# Patient Record
Sex: MALE | Race: WHITE | NOT HISPANIC OR LATINO | Employment: FULL TIME | URBAN - METROPOLITAN AREA
[De-identification: names, ages, dates, MRNs, and addresses within clinical notes are randomized per-mention and may not be internally consistent; named-entity substitution may affect disease eponyms.]

---

## 2019-03-09 ENCOUNTER — HOSPITAL ENCOUNTER (OUTPATIENT)
Dept: URGENT CARE | Facility: CLINIC | Age: 54
Discharge: HOME | End: 2019-03-09
Attending: PHYSICIAN ASSISTANT

## 2019-08-07 ENCOUNTER — OFFICE VISIT CONVERTED (OUTPATIENT)
Dept: FAMILY MEDICINE CLINIC | Facility: CLINIC | Age: 54
End: 2019-08-07
Attending: NURSE PRACTITIONER

## 2019-08-07 ENCOUNTER — HOSPITAL ENCOUNTER (OUTPATIENT)
Dept: GENERAL RADIOLOGY | Facility: HOSPITAL | Age: 54
Discharge: HOME | End: 2019-08-07
Attending: NURSE PRACTITIONER

## 2019-08-15 ENCOUNTER — HOSPITAL ENCOUNTER (OUTPATIENT)
Dept: GENERAL RADIOLOGY | Facility: HOSPITAL | Age: 54
Discharge: HOME | End: 2019-08-15
Attending: NURSE PRACTITIONER

## 2019-08-21 ENCOUNTER — HOSPITAL ENCOUNTER (OUTPATIENT)
Dept: LAB | Facility: HOSPITAL | Age: 54
Discharge: HOME | End: 2019-08-21
Attending: NURSE PRACTITIONER

## 2019-08-21 ENCOUNTER — OFFICE VISIT CONVERTED (OUTPATIENT)
Dept: ORTHOPEDIC SURGERY | Facility: CLINIC | Age: 54
End: 2019-08-21
Attending: ORTHOPAEDIC SURGERY

## 2019-08-21 ENCOUNTER — CONVERSION ENCOUNTER (OUTPATIENT)
Dept: ORTHOPEDIC SURGERY | Facility: CLINIC | Age: 54
End: 2019-08-21

## 2019-08-21 LAB
APPEARANCE UR: CLEAR
BASOPHILS # BLD AUTO: 0.11 10*3/UL (ref 0–0.2)
BASOPHILS NFR BLD AUTO: 1.9 % (ref 0–3)
BILIRUB UR QL: NEGATIVE
CHOLEST SERPL-MCNC: 170 MG/DL (ref 107–200)
CHOLEST/HDLC SERPL: 3.8 {RATIO} (ref 3–6)
COLOR UR: YELLOW
CONV ABS IMM GRAN: 0.01 10*3/UL (ref 0–0.2)
CONV COLLECTION SOURCE (UA): NORMAL
CONV IMMATURE GRAN: 0.2 % (ref 0–1.8)
CONV UROBILINOGEN IN URINE BY AUTOMATED TEST STRIP: 0.2 {EHRLICHU}/DL (ref 0.1–1)
DEPRECATED RDW RBC AUTO: 44.8 FL (ref 35.1–43.9)
EOSINOPHIL # BLD AUTO: 0.24 10*3/UL (ref 0–0.7)
EOSINOPHIL # BLD AUTO: 4.2 % (ref 0–7)
ERYTHROCYTE [DISTWIDTH] IN BLOOD BY AUTOMATED COUNT: 14.6 % (ref 11.6–14.4)
FERRITIN SERPL-MCNC: 198 NG/ML (ref 30–300)
GLUCOSE UR QL: NEGATIVE MG/DL
HCT VFR BLD AUTO: 41.5 % (ref 42–52)
HDLC SERPL-MCNC: 45 MG/DL (ref 40–60)
HGB BLD-MCNC: 12.7 G/DL (ref 14–18)
HGB UR QL STRIP: NEGATIVE
IRON SATN MFR SERPL: 21 % (ref 20–55)
IRON SERPL-MCNC: 64 UG/DL (ref 70–180)
KETONES UR QL STRIP: NEGATIVE MG/DL
LDLC SERPL CALC-MCNC: 113 MG/DL (ref 70–100)
LEUKOCYTE ESTERASE UR QL STRIP: NEGATIVE
LYMPHOCYTES # BLD AUTO: 1.49 10*3/UL (ref 1–5)
LYMPHOCYTES NFR BLD AUTO: 25.9 % (ref 20–45)
MCH RBC QN AUTO: 25.8 PG (ref 27–31)
MCHC RBC AUTO-ENTMCNC: 30.6 G/DL (ref 33–37)
MCV RBC AUTO: 84.2 FL (ref 80–96)
MONOCYTES # BLD AUTO: 0.55 10*3/UL (ref 0.2–1.2)
MONOCYTES NFR BLD AUTO: 9.6 % (ref 3–10)
NEUTROPHILS # BLD AUTO: 3.35 10*3/UL (ref 2–8)
NEUTROPHILS NFR BLD AUTO: 58.2 % (ref 30–85)
NITRITE UR QL STRIP: NEGATIVE
NRBC CBCN: 0 % (ref 0–0.7)
PH UR STRIP.AUTO: 5 [PH] (ref 5–8)
PLATELET # BLD AUTO: 306 10*3/UL (ref 130–400)
PMV BLD AUTO: 11.4 FL (ref 9.4–12.4)
PROT UR QL: NEGATIVE MG/DL
PSA SERPL-MCNC: 0.19 NG/ML (ref 0–4)
RBC # BLD AUTO: 4.93 10*6/UL (ref 4.7–6.1)
SP GR UR: 1.02 (ref 1–1.03)
TIBC SERPL-MCNC: 310 UG/DL (ref 245–450)
TRANSFERRIN SERPL-MCNC: 217 MG/DL (ref 215–365)
TRIGL SERPL-MCNC: 60 MG/DL (ref 40–150)
VLDLC SERPL-MCNC: 12 MG/DL (ref 5–37)
WBC # BLD AUTO: 5.75 10*3/UL (ref 4.8–10.8)

## 2019-08-23 ENCOUNTER — HOSPITAL ENCOUNTER (OUTPATIENT)
Dept: LAB | Facility: HOSPITAL | Age: 54
Discharge: HOME | End: 2019-08-23
Attending: NURSE PRACTITIONER

## 2019-09-09 ENCOUNTER — OFFICE VISIT CONVERTED (OUTPATIENT)
Dept: FAMILY MEDICINE CLINIC | Facility: CLINIC | Age: 54
End: 2019-09-09
Attending: NURSE PRACTITIONER

## 2019-09-09 ENCOUNTER — CONVERSION ENCOUNTER (OUTPATIENT)
Dept: FAMILY MEDICINE CLINIC | Facility: CLINIC | Age: 54
End: 2019-09-09

## 2019-09-27 ENCOUNTER — HOSPITAL ENCOUNTER (OUTPATIENT)
Dept: GASTROENTEROLOGY | Facility: HOSPITAL | Age: 54
Setting detail: HOSPITAL OUTPATIENT SURGERY
Discharge: HOME | End: 2019-09-27
Attending: INTERNAL MEDICINE

## 2020-03-09 ENCOUNTER — OFFICE VISIT CONVERTED (OUTPATIENT)
Dept: FAMILY MEDICINE CLINIC | Facility: CLINIC | Age: 55
End: 2020-03-09
Attending: NURSE PRACTITIONER

## 2020-04-09 ENCOUNTER — TELEMEDICINE CONVERTED (OUTPATIENT)
Dept: FAMILY MEDICINE CLINIC | Facility: CLINIC | Age: 55
End: 2020-04-09
Attending: NURSE PRACTITIONER

## 2020-05-04 ENCOUNTER — TELEMEDICINE CONVERTED (OUTPATIENT)
Dept: FAMILY MEDICINE CLINIC | Facility: CLINIC | Age: 55
End: 2020-05-04
Attending: NURSE PRACTITIONER

## 2020-12-02 ENCOUNTER — OFFICE VISIT CONVERTED (OUTPATIENT)
Dept: ORTHOPEDIC SURGERY | Facility: CLINIC | Age: 55
End: 2020-12-02
Attending: ORTHOPAEDIC SURGERY

## 2020-12-04 ENCOUNTER — OFFICE VISIT CONVERTED (OUTPATIENT)
Dept: FAMILY MEDICINE CLINIC | Facility: CLINIC | Age: 55
End: 2020-12-04
Attending: NURSE PRACTITIONER

## 2020-12-09 ENCOUNTER — HOSPITAL ENCOUNTER (OUTPATIENT)
Dept: OTHER | Facility: HOSPITAL | Age: 55
Discharge: HOME | End: 2020-12-09
Attending: ORTHOPAEDIC SURGERY

## 2020-12-21 ENCOUNTER — OFFICE VISIT CONVERTED (OUTPATIENT)
Dept: ORTHOPEDIC SURGERY | Facility: CLINIC | Age: 55
End: 2020-12-21
Attending: ORTHOPAEDIC SURGERY

## 2020-12-28 ENCOUNTER — HOSPITAL ENCOUNTER (OUTPATIENT)
Dept: LAB | Facility: HOSPITAL | Age: 55
Discharge: HOME | End: 2020-12-28
Attending: NURSE PRACTITIONER

## 2020-12-28 LAB
ALBUMIN SERPL-MCNC: 4.4 G/DL (ref 3.5–5)
ALBUMIN/GLOB SERPL: 1.5 {RATIO} (ref 1.4–2.6)
ALP SERPL-CCNC: 85 U/L (ref 56–119)
ALT SERPL-CCNC: 36 U/L (ref 10–40)
ANION GAP SERPL CALC-SCNC: 16 MMOL/L (ref 8–19)
APPEARANCE UR: CLEAR
AST SERPL-CCNC: 34 U/L (ref 15–50)
BASOPHILS # BLD AUTO: 0.08 10*3/UL (ref 0–0.2)
BASOPHILS NFR BLD AUTO: 1.6 % (ref 0–3)
BILIRUB SERPL-MCNC: 0.63 MG/DL (ref 0.2–1.3)
BILIRUB UR QL: NEGATIVE
BUN SERPL-MCNC: 19 MG/DL (ref 5–25)
BUN/CREAT SERPL: 21 {RATIO} (ref 6–20)
CALCIUM SERPL-MCNC: 9.3 MG/DL (ref 8.7–10.4)
CHLORIDE SERPL-SCNC: 105 MMOL/L (ref 99–111)
CHOLEST SERPL-MCNC: 220 MG/DL (ref 107–200)
CHOLEST/HDLC SERPL: 3.7 {RATIO} (ref 3–6)
COLOR UR: YELLOW
CONV ABS IMM GRAN: 0.01 10*3/UL (ref 0–0.2)
CONV CO2: 23 MMOL/L (ref 22–32)
CONV COLLECTION SOURCE (UA): NORMAL
CONV IMMATURE GRAN: 0.2 % (ref 0–1.8)
CONV TOTAL PROTEIN: 7.3 G/DL (ref 6.3–8.2)
CONV UROBILINOGEN IN URINE BY AUTOMATED TEST STRIP: 0.2 {EHRLICHU}/DL (ref 0.1–1)
CREAT UR-MCNC: 0.89 MG/DL (ref 0.7–1.2)
DEPRECATED RDW RBC AUTO: 43.9 FL (ref 35.1–43.9)
EOSINOPHIL # BLD AUTO: 0.31 10*3/UL (ref 0–0.7)
EOSINOPHIL # BLD AUTO: 6.2 % (ref 0–7)
ERYTHROCYTE [DISTWIDTH] IN BLOOD BY AUTOMATED COUNT: 14.6 % (ref 11.6–14.4)
FERRITIN SERPL-MCNC: 124 NG/ML (ref 30–300)
GFR SERPLBLD BASED ON 1.73 SQ M-ARVRAT: >60 ML/MIN/{1.73_M2}
GLOBULIN UR ELPH-MCNC: 2.9 G/DL (ref 2–3.5)
GLUCOSE SERPL-MCNC: 98 MG/DL (ref 70–99)
GLUCOSE UR QL: NEGATIVE MG/DL
HCT VFR BLD AUTO: 42.1 % (ref 42–52)
HDLC SERPL-MCNC: 60 MG/DL (ref 40–60)
HGB BLD-MCNC: 13.3 G/DL (ref 14–18)
HGB UR QL STRIP: NEGATIVE
IRON SATN MFR SERPL: 29 % (ref 20–55)
IRON SERPL-MCNC: 105 UG/DL (ref 70–180)
KETONES UR QL STRIP: NEGATIVE MG/DL
LDLC SERPL CALC-MCNC: 147 MG/DL (ref 70–100)
LEUKOCYTE ESTERASE UR QL STRIP: NEGATIVE
LYMPHOCYTES # BLD AUTO: 1.21 10*3/UL (ref 1–5)
LYMPHOCYTES NFR BLD AUTO: 24.3 % (ref 20–45)
MCH RBC QN AUTO: 26.3 PG (ref 27–31)
MCHC RBC AUTO-ENTMCNC: 31.6 G/DL (ref 33–37)
MCV RBC AUTO: 83.2 FL (ref 80–96)
MONOCYTES # BLD AUTO: 0.51 10*3/UL (ref 0.2–1.2)
MONOCYTES NFR BLD AUTO: 10.2 % (ref 3–10)
NEUTROPHILS # BLD AUTO: 2.86 10*3/UL (ref 2–8)
NEUTROPHILS NFR BLD AUTO: 57.5 % (ref 30–85)
NITRITE UR QL STRIP: NEGATIVE
NRBC CBCN: 0 % (ref 0–0.7)
OSMOLALITY SERPL CALC.SUM OF ELEC: 292 MOSM/KG (ref 273–304)
PH UR STRIP.AUTO: 5.5 [PH] (ref 5–8)
PLATELET # BLD AUTO: 270 10*3/UL (ref 130–400)
PMV BLD AUTO: 11.2 FL (ref 9.4–12.4)
POTASSIUM SERPL-SCNC: 4.1 MMOL/L (ref 3.5–5.3)
PROT UR QL: NEGATIVE MG/DL
PSA SERPL-MCNC: 0.18 NG/ML (ref 0–4)
RBC # BLD AUTO: 5.06 10*6/UL (ref 4.7–6.1)
SODIUM SERPL-SCNC: 140 MMOL/L (ref 135–147)
SP GR UR: 1.02 (ref 1–1.03)
TIBC SERPL-MCNC: 365 UG/DL (ref 245–450)
TRANSFERRIN SERPL-MCNC: 255 MG/DL (ref 215–365)
TRIGL SERPL-MCNC: 65 MG/DL (ref 40–150)
TSH SERPL-ACNC: 1.75 M[IU]/L (ref 0.27–4.2)
VLDLC SERPL-MCNC: 13 MG/DL (ref 5–37)
WBC # BLD AUTO: 4.98 10*3/UL (ref 4.8–10.8)

## 2021-01-23 ENCOUNTER — HOSPITAL ENCOUNTER (OUTPATIENT)
Dept: PREADMISSION TESTING | Facility: HOSPITAL | Age: 56
Discharge: HOME | End: 2021-01-23
Attending: ORTHOPAEDIC SURGERY

## 2021-01-24 LAB — SARS-COV-2 RNA SPEC QL NAA+PROBE: NOT DETECTED

## 2021-01-28 ENCOUNTER — HOSPITAL ENCOUNTER (OUTPATIENT)
Dept: PERIOP | Facility: HOSPITAL | Age: 56
Setting detail: HOSPITAL OUTPATIENT SURGERY
Discharge: HOME | End: 2021-01-28
Attending: ORTHOPAEDIC SURGERY

## 2021-02-10 ENCOUNTER — OFFICE VISIT CONVERTED (OUTPATIENT)
Dept: ORTHOPEDIC SURGERY | Facility: CLINIC | Age: 56
End: 2021-02-10
Attending: PHYSICIAN ASSISTANT

## 2021-02-10 ENCOUNTER — CONVERSION ENCOUNTER (OUTPATIENT)
Dept: ORTHOPEDIC SURGERY | Facility: CLINIC | Age: 56
End: 2021-02-10

## 2021-03-02 ENCOUNTER — OFFICE VISIT CONVERTED (OUTPATIENT)
Dept: FAMILY MEDICINE CLINIC | Facility: CLINIC | Age: 56
End: 2021-03-02
Attending: NURSE PRACTITIONER

## 2021-03-12 ENCOUNTER — OFFICE VISIT CONVERTED (OUTPATIENT)
Dept: ORTHOPEDIC SURGERY | Facility: CLINIC | Age: 56
End: 2021-03-12
Attending: PHYSICIAN ASSISTANT

## 2021-04-09 ENCOUNTER — OFFICE VISIT CONVERTED (OUTPATIENT)
Dept: ORTHOPEDIC SURGERY | Facility: CLINIC | Age: 56
End: 2021-04-09

## 2021-05-10 NOTE — H&P
History and Physical      Patient Name: Kiran Hensley Sr   Patient ID: [id number]   Sex: Male   YOB: 1965    Primary Care Provider: Maggie CARTAGENA   Referring Provider: Maggie CARTAGENA    Visit Date: December 2, 2020    Provider: Puneet Coleman MD   Location: [facility]   Location Address: [address] [place] [address]  [id number]   Location Phone: [phone]          Chief Complaint  · Left Shoulder Pain      History Of Present Illness  Kiran Hensley Sr is a 55 year old /White male who presents today to [facility].      Patient has a history of a large rotator cuff tear in his left shoulder. He was scheduled for a Left Shoulder Arthroscopy, Subacromial Decompression, Distal Claviculectomy, Rotator Cuff Repair and Biceps Tenodesis on 11/7/2019 but his insurance wouldn't cover it due to him not trying other treatments first. Patient has been living with rotator cuff tear for a year since then and has been doing well without any treatment. He wasn't experiencing any soreness or pain before the injury at work. Patient was able to do his job well and other activities of daily living with no problem. Patient was at work on 11/18/20, lifting an object and felt a huge pop in his shoulder causing an onset pain in his shoulder. Since the pop at work he has been having trouble with his shoulder ROM and a onset of weakness.          Past Medical History  Arthritis         Past Surgical History  *Metal Implant; Colonoscopy; EGD; Joint Surgery; Knee replacement, left         Medication List  lisinopril 10 mg oral tablet; trazodone 100 mg oral tablet         Allergy List  Adhesive Tape; Tape Adherent       Allergies Reconciled  Family Medical History  Stroke; Cancer, Unspecified; No family history of colorectal cancer; Family history of Arthritis         Social History  Alcohol Use; Caffeine (Current every day); lives with spouse; ; .; Recreational  Drug Use (Never); Second hand smoke exposure (Never); Tobacco (Never); Working         Immunizations  Name Date Admin   Influenza 11/05/2020   Influenza 11/05/2020         Review of Systems  · Constitutional  o Denies  o : fever, chills, weight loss  · Cardiovascular  o Denies  o : chest pain, shortness of breath  · Gastrointestinal  o Denies  o : liver disease, heartburn, nausea, blood in stools  · Genitourinary  o Denies  o : painful urination, blood in urine  · Integument  o Denies  o : rash, itching  · Neurologic  o Denies  o : headache, weakness, loss of consciousness  · Musculoskeletal  o Denies  o : painful, swollen joints  · Psychiatric  o Denies  o : drug/alcohol addiction, anxiety, depression      Vitals  Date Time BP Position Site L\R Cuff Size HR RR TEMP (F) WT  HT  BMI kg/m2 BSA m2 O2 Sat FR L/min FiO2 HC       12/02/2020 07:48 AM         200lbs 0oz 6'   27.12 2.15             Physical Examination  · Constitutional  o Appearance  o : well developed, well-nourished, no obvious deformities present  · Head and Face  o Head  o :   § Inspection  § : normocephalic  o Face  o :   § Inspection  § : no facial lesions  · Eyes  o Conjunctivae  o : conjunctivae normal  o Sclerae  o : sclerae white  · Ears, Nose, Mouth and Throat  o Ears  o :   § External Ears  § : appearance within normal limits  § Hearing  § : intact  o Nose  o :   § External Nose  § : appearance normal  · Neck  o Inspection/Palpation  o : normal appearance  o Range of Motion  o : full range of motion  · Respiratory  o Respiratory Effort  o : breathing unlabored  o Inspection of Chest  o : normal appearance  o Auscultation of Lungs  o : no audible wheezing or rales  · Cardiovascular  o Heart  o : regular rate  · Gastrointestinal  o Abdominal Examination  o : soft and non-tender  · Skin and Subcutaneous Tissue  o General Inspection  o : intact, no rashes  · Psychiatric  o General  o : Alert and oriented x3  o Judgement and Insight  o : judgment and  insight intact  o Mood and Affect  o : mood normal, affect appropriate  · Left Shoulder  o Inspection  o : Sensation grossly intact. Neurovascular intact. Skin intact. Radial pulse 2+, ulnar pulse 2+. Atrophy of the rotator cuff. No skin discoloration or swelling. Tender biceps tendon. Tender AC joint. Tender at greater tuberosity. Forward flexion to 110. IR to back pocket. Pain with cross body adduction. Pain with empty can testing. Positive [place]. Positive Neer's. Pain with drop arm testing. 2 out of 5 strength. Good flexion and extension of elbow.   · In Office Procedures  o View  o : AP/LATERAL  o Site  o : left, scapula  o Indication  o : Left Shoulder Pain  o Study  o : X-rays ordered, taken in the office, and reviewed today.  o Xray  o : Evidence of AC joint arthritis. No fracture or dislocation.           Assessment  · Left shoulder pain, unspecified chronicity     719.41/M25.512  · Rotator cuff tear of left shoulder     840.4/M75.100      Plan  · Orders  o Scapula (Left) [facility] Preferred View ([id number]) - 719.41/M25.512 - 12/02/2020  · Medications  o Medications have been Reconciled  o Transition of Care or Provider Policy  · Instructions  o Dr. Coleman saw and examined the patient and agrees with plan.   o X-rays reviewed by Dr. Coleman.  o Reviewed the patient's Past Medical, Social, and Family history as well as the ROS at today's visit, no changes.  o Call or return if worsening symptoms.  o Follow up after MRI.  o This note was transcribed by Maria Ines Smith. darek  o Discussed diagnosis and treatment options with the patient. Patient will proceed with getting a new MRI.            Electronically Signed by: Maria Ines Smith-, Other -Author on December 3, 2020 10:30:54 AM  Electronically Co-signed by: Puneet Coleman MD -Reviewer on December 3, 2020 09:28:58 PM

## 2021-05-12 NOTE — PROGRESS NOTES
Quick Note      Patient Name: Kiran Hensley Sr   Patient ID: [id number]   Sex: Male   YOB: 1965    Primary Care Provider: Maggie CARTAGENA   Referring Provider: Maggie CARTAGENA    Visit Date: April 9, 2020    Provider: OSIEL Mena   Location: [facility]   Location Address: [address] [place] [address], [address]  [place]  [id number]   Location Phone: [phone]          History Of Present Illness  TELEHEALTH VISIT  Chief Complaint: insomnia   Kiran Hensley Sr is a 54 year old /White male who is presenting for evaluation via telehealth visit. Verbal consent obtained before beginning visit.   Provider spent 15 minutes with the patient during telehealth visit.   The following staff were present during this visit: Maggie CARTAGENA   Past Medical History/Overview of Patient Symptoms     insomnia-reports sleep not improved. States he is able to go to sleep fast but wakes up 2-3 hours after falling asleep. States the trazodone has helped him fall back to sleep quicker but still waking up.     htn-blood pressure log reviewed in the office today. States he takes it bid-morning and night. States his mornings are higher and evenings closer to normal.     AM-138/98, 136/93, 136/95, 132/92, 147/93, 141/90    pm-116/80, 126/84, 131/80, 132/88, 132/87, 114/82, 136/89, 143/89, 121/85, 132/91, 130/80, 121/87    pulse 65-97    knee swelling-reports it is much improved-states he took voltaren which helped.     WILMER-reports his allergies are a little better. Admits he has not been taking his antihistamine.               Assessment  · Essential hypertension     401.9/I10  reviewed b/p log -his morning blood pressures are borderline high and some of his evening blood pressures are as well-prescribed lisinopril 10mg qd. Scheduled a 1 month f/u to reassess. He is to keep a log and bring back to his nest visit.   · Insomnia, unspecified     780.52/G47.00  reports insomnia some improved but he  is still waking up 2-3 hours after falling asleep-increased trazodone to 100mg q hs.    Problems Reconciled  Plan  · Orders  o Physician Telephone Evaluation, 11-20 minutes (82248) - - 04/12/2020  · Medications  o lisinopril 10 mg oral tablet   SIG: take 1 tablet (10 mg) by oral route once daily   DISP: (30) tablets with 0 refills  Prescribed on 04/09/2020     o trazodone 100 mg oral tablet   SIG: take 1 tablet (100 mg) by oral route once daily at bedtime   DISP: (30) tablets with 0 refills  Adjusted on 04/09/2020     · Instructions  o Patient advised to monitor blood pressure (B/P) at home and journal readings. Patient informed that a B/P reading at home of more than 130/80 is considered hypertension. For readings greater psph894/90 or higher patient is advised to follow up in the office with readings for management. Patient advised to limit sodium intake.  o Avoid any electronic use for at least 30 minutes prior to bed time. Cell phone screens, tablets and TVs imitate daylight, so your brain can become confused on the time of day. No caffeine use in the late afternoon and evenings.  o Plan Of Care:   o Call the office with any concerns or questions.  o Discussed Covid-19 precautions including, but not limited to, social distancing, avoid touching your face, and hand washing.   o 1 month follow up  · Disposition  o Call or Return if symptoms worsen or persist.            Electronically Signed by: OSIEL Mena -Author on April 12, 2020 08:37:29 PM

## 2021-05-13 NOTE — PROGRESS NOTES
Quick Note      Patient Name: Kiran Hensley Sr   Patient ID: [id number]   Sex: Male   YOB: 1965    Primary Care Provider: Maggie CARTAGENA   Referring Provider: Maggie CARTAGENA    Visit Date: May 4, 2020    Provider: OSIEL Mena   Location: [facility]   Location Address: [address] [place] [address], [address]  [place]  [id number]   Location Phone: [phone]          History Of Present Illness  TELEHEALTH TELEPHONE VISIT  Chief Complaint: HTN AND INSOMNIA   Kiran Hensley Sr is a 54 year old /White male who is presenting for evaluation via telehealth telephone visit. Verbal consent obtained before beginning visit.   Provider spent 15 minutes with the patient during telehealth visit.   The following staff were present during this visit: Maggie CARTAGENA   Past Medical History/Overview of Patient Symptoms     Patient is here today on phone w c/c of HTN and insomnia.    htn-reports when he takes his medication it runs well. States his b/p running 125/79, 130/79, 136/95. States he takes it in the morning it runs well. Denies h/a or facial flushing.    insomnia-she is taking the 100mg trazodone. Reports he falls asleep and stays asleep 7-8 hours per day.           Assessment  · Insomnia, unspecified     780.52/G47.00  insomnia improved after increasing the dose to 100mg. He is able to sleep 7-8 hours per night. Refilled trazodone.  · Hypertension     401.9/I10  blood pressure within goal as long as he takes his medication. Refilled lisinopril today.       Plan  · Orders  o Physician Telephone Evaluation, 11-20 minutes (12966) - - 05/08/2020  · Medications  o lisinopril 10 mg oral tablet   SIG: take 1 tablet (10 mg) by oral route once daily for 90 days   DISP: (90) tablets with 1 refills  Adjusted on 05/04/2020     o trazodone 100 mg oral tablet   SIG: take 1 tablet (100 mg) by oral route once daily at bedtime for 90 days   DISP: (90) tablets with 1 refills  Adjusted on  05/04/2020     o Medications have been Reconciled  o Transition of Care or Provider Policy  · Instructions  o Plan Of Care:   o Take all medications as prescribed/directed.  o Call the office with any concerns or questions.  o Discussed Covid-19 precautions including, but not limited to, social distancing, avoid touching your face, and hand washing.   o 6 month follow up            Electronically Signed by: OSIEL Mena -Author on May 8, 2020 10:09:21 PM

## 2021-05-13 NOTE — PROGRESS NOTES
"   Progress Note      Patient Name: Kiran Hensley Sr   Patient ID: [id number]   Sex: Male   YOB: 1965    Primary Care Provider: Maggie CARTAGENA   Referring Provider: Maggie CARTAGENA    Visit Date: December 4, 2020    Provider: OSIEL Mena   Location: [facility]   Location Address: [address] [place] [address], [address]  [place], [place]  [id number]   Location Phone: [phone]          Chief Complaint  · 6 MONTHS F/U      History Of Present Illness  Kiran Hensley Sr is a 55 year old /White male who presents for evaluation and treatment of:      Patient is here today for 6 months follow up.     States he sees Dr. Coleman for his L shoulder issues. He had an x-ray Monday-no signs of  arthritis were present so he ordered a MRI.  The MRI  is vandana for Monday. He was vandana for surgery last yr to repair but his ins would not cover the surgery. States he has been doing fine until the other day he was lifting a septic tank and it popped and has been painful ever since. States he has done the same thing a thousand times but he cannot raise his arm like a \"chicken wing\". Admits it is more painful at night. He was prescribed hydrocodone but has not gotten it filled. He is taking otc pain relief which seems to help.     States he had R knee replacement 01/2020 with Dr. Lopez in [place]. He has jackie with him 12/22 to discuss issues. Reprots when he lays on his side at night with his leg bent it clicks like there is something in there. He had an xray which did not show a foreign body. Reports it is getting worse he feels it sometimes during the day.     States he do not check BP at home but it is great in the office. He is taking lisinopril 10mg qd.     colonoscopy 9/27/19.     flu vaccine 11/20.    admits stabbing pain between his shoulder blades x2m but he has not noticed it in the last wk or so-admits it occurs at rest. Denies chest pain, radiating pain to the left arm, jaw, " diaphoresis, N/V. States he had a cardiac w/u in 2012 when he was going through his divorce-he went to ER -had labs, EKG then later echo, stress test was told it was due to anxiety. EKG performed in the office today did not show signs of ST elevation. Referred to cardiology for workup-will need clearance prior to surgery.    admits facial flushing which he has had for years is questioning the cause-discussed possible etiologies of htn but his b/p is under good control and alcohol use but reports he only drinks occasionally. States his face has always gotten flushed when he was anxious.    due labs today-ordered CBC, CMP, tsh, lipid, psa, iron profile, ferritin, U/A.     hx of iron def anemia-he has not been taking the iron-rechecked today.       Past Medical History  Disease Name Date Onset Notes   Arthritis --  --          Past Surgical History  Procedure Name Date Notes   *Metal Implant --  --    Colonoscopy 2019 --    EGD 2011 Dr. Monteiro   Joint Surgery --  --    Knee replacement, left --  --          Medication List  Name Date Started Instructions   lisinopril 10 mg oral tablet 12/04/2020 take 1 tablet (10 mg) by oral route once daily for 90 days         Allergy List  Allergen Name Date Reaction Notes   Adhesive Tape --  --  --    Tape Adherent --  --  --        Allergies Reconciled  Family Medical History  Disease Name Relative/Age Notes   Stroke Uncle/   --    Cancer, Unspecified Grandfather (paternal)/  Grandmother (paternal)/   --    No family history of colorectal cancer  --    Family history of Arthritis Father/  Mother/   Mother; Father         Social History  Finding Status Start/Stop Quantity Notes   Alcohol Use --  --/-- --  12/04/2020 - 05/04/2020 - 03/09/2020 - rarely drinks   Caffeine Current every day 0/0 --  drinks regularly; coffee, tea and soft drinks; 3-4 times per day   lives with spouse --  --/-- --  --     --  --/-- --  --    Recreational Drug Use Never --/-- --  no   Second hand smoke  exposure Never 0/0 --  no   Tobacco Never --/-- --  never smoker  never a smoker   Working --  --/-- --  --          Immunizations  NameDate Admin Mfg Trade Name Lot Number Route Inj VIS Given VIS Publication   [id number]/2020 [facility] Fluzone Quadrivalent [id number] IM LD 11/05/2020 08/15/2019   Comments:          Review of Systems  · Constitutional  o Denies  o : fever, fatigue, weight loss, weight gain  · Cardiovascular  o Denies  o : lower extremity edema, claudication, chest pressure, palpitations  · Respiratory  o Denies  o : shortness of breath, wheezing, cough, hemoptysis, dyspnea on exertion  · Gastrointestinal  o Denies  o : nausea, vomiting, diarrhea, constipation, abdominal pain  · Musculoskeletal  o Admits  o : pain between shoulder blades, right knee pain, left shoulder pain      Vitals  Date Time BP Position Site L\R Cuff Size HR RR TEMP (F) WT  HT  BMI kg/m2 BSA m2 O2 Sat FR L/min FiO2 HC       03/09/2020 08:24 /90 Sitting    68 - R   209lbs 8oz 6'   28.41 2.2 100 %      12/04/2020 08:27 /70 Sitting    78 - R   205lbs 16oz 6'   27.94 2.18 100 %            Physical Examination  · Constitutional  o Appearance  o : alert, in no acute distress, well developed, well-nourished  · Neck  o Thyroid  o : no thyomegaly, no palpabale masses   · Respiratory  o Auscultation of Lungs  o : normal breath sounds throughout, no wheeze, rhonchi, or crackles  · Cardiovascular  o Heart  o : Regular rate and rhythm, Normal S1,S2 , No cardiac murmers, No S3 or S4 gallop or rubs  · Skin and Subcutaneous Tissue  o General Inspection  o : no rashes, normal skin color, warm and dry  o Digits and Nails  o : no clubbing, cyanosis, deformities or edema present, normal appearing nails  · Neurologic  o Mental Status Examination  o : alert and oriented to time, place, and person. Gait and Station: normal gait, able to stand without difficulty  · Psychiatric  o Judgement and Insight  o : judgment and insight intact  o Mood  and Affect  o : normal mood and affect          Assessment  · Screening for depression     V79.0/Z13.89  · Screening for lipid disorders     V77.91/Z13.220  · Screening for prostate cancer     V76.44/Z12.5  · Arthritis     716.90/M19.90  · Hypertension     401.9/I10  · Screening for thyroid disorder     V77.0/Z13.29  · Routine lab draw     V72.60/Z01.89  · Iron deficiency anemia     280.9/D50.9  · Stabbing pain     780.96/R52  · Shoulder pain, left     719.41/M25.512  · Right knee pain     719.46/M25.561      Plan  · Orders  o Male Physical Primary Care Panel (CMP, CBC, TSH, Lipid, PSA) [facility] (, 69736, 33095, 36825, 92069, 92012) - V77.91/Z13.220, V77.0/Z13.29, V72.60/Z01.89, V76.44/Z12.5 - 12/04/2020  o Urinalysis with Reflex Microscopy ([facility]) (89059) - V72.60/Z01.89 - 12/04/2020  o ACO-39: Current medications updated and reviewed (1159F, ) - - 12/04/2020  o ACO-18: Negative screen for clinical depression using a standardized tool () - - 12/04/2020  o ACO-14: Influenza immunization administered or previously received [facility] () - - 12/04/2020  o ACO-19: Colorectal cancer screening results documented and reviewed (3017F) - - 09/27/2019  o Iron Profile (Iron 99176 TIBC 93352 and Transferrin 65909) (IRONP) - 280.9/D50.9 - 12/04/2020  o Ferritin (46951) - 280.9/D50.9 - 12/04/2020  o EKG (Recording and Interpretation) [facility] (Done and read at [facility]) (39991) - - 12/04/2020  o CARDIOLOGY CONSULTATION (CARDI) - - 12/04/2020  · Medications  o lisinopril 10 mg oral tablet   SIG: take 1 tablet (10 mg) by oral route once daily for 90 days   DISP: (90) Tablet with 1 refills  Refilled on 12/04/2020     o trazodone 100 mg oral tablet   SIG: take 1 tablet (100 mg) by oral route once daily at bedtime for 90 days   DISP: (90) tablets with 1 refills  Discontinued on 12/04/2020     o Medications have been Reconciled  o Transition of Care or Provider Policy  · Instructions  o Depression Screen completed and scanned into the  EMR under the designated folder within the patient's documents.  o Today's PHQ-9 result is 0.  o Take all medications as prescribed/directed.  o Patient was educated/instructed on their diagnosis, treatment and medications prior to discharge from the clinic today.  o follow up after cardiology consult  o Electronically Identified Patient Education Materials Provided Electronically  · Disposition  o Call or Return if symptoms worsen or persist.            Electronically Signed by: OSIEL Mena -Author on December 4, 2020 09:43:52 AM

## 2021-05-14 VITALS — WEIGHT: 205 LBS | HEIGHT: 72 IN | OXYGEN SATURATION: 96 % | BODY MASS INDEX: 27.77 KG/M2 | HEART RATE: 74 BPM

## 2021-05-14 VITALS — OXYGEN SATURATION: 98 % | HEIGHT: 72 IN | WEIGHT: 205 LBS | HEART RATE: 94 BPM | BODY MASS INDEX: 27.77 KG/M2

## 2021-05-14 VITALS — HEIGHT: 72 IN | WEIGHT: 200 LBS | BODY MASS INDEX: 27.09 KG/M2

## 2021-05-14 VITALS
BODY MASS INDEX: 27.9 KG/M2 | DIASTOLIC BLOOD PRESSURE: 70 MMHG | OXYGEN SATURATION: 100 % | HEART RATE: 78 BPM | SYSTOLIC BLOOD PRESSURE: 120 MMHG | WEIGHT: 206 LBS | HEIGHT: 72 IN

## 2021-05-14 VITALS
DIASTOLIC BLOOD PRESSURE: 79 MMHG | BODY MASS INDEX: 28.76 KG/M2 | SYSTOLIC BLOOD PRESSURE: 121 MMHG | OXYGEN SATURATION: 98 % | WEIGHT: 212.37 LBS | HEART RATE: 75 BPM | HEIGHT: 72 IN

## 2021-05-14 VITALS — BODY MASS INDEX: 28.71 KG/M2 | HEIGHT: 72 IN | WEIGHT: 212 LBS

## 2021-05-14 VITALS — WEIGHT: 210.31 LBS | HEIGHT: 72 IN | BODY MASS INDEX: 28.48 KG/M2

## 2021-05-14 NOTE — PROGRESS NOTES
Progress Note      Patient Name: Kiran Hensley Sr   Patient ID: [id number]   Sex: Male   YOB: 1965    Primary Care Provider: Maggie CARTAGENA   Referring Provider: Maggie CARTAGENA    Visit Date: February 10, 2021    Provider: Amanda Dailey PA-C   Location: [facility]   Location Address: [address] [place] [address]  [id number]   Location Phone: [phone]          Chief Complaint  · Surgery follow up left shoulder arthroscopy      History Of Present Illness  Kiran Hensley Sr is a 55 year old /White male who presents today to [facility].      He is s/p left SAD/DC, debridement of labrum, and mini open RCR 1/28/21 by Dr. Coleman. He is doing well.  He is compliant with sling.       Past Medical History  Arthritis; Hypertension; Reflux         Past Surgical History  *Metal Implant; Artificial Joints/Limbs; Colonoscopy; EGD; Joint Surgery; Knee replacement, left         Medication List  lisinopril 10 mg oral tablet; Percocet 7.5-325 mg oral tablet         Allergy List  Adhesive Tape; Tape Adherent       Allergies Reconciled  Family Medical History  Stroke; Cancer, Unspecified; No family history of colorectal cancer; Family history of Arthritis         Social History  Alcohol Use (Current some day); Caffeine (Current every day); lives with spouse; ; .; Recreational Drug Use (Never); Second hand smoke exposure (Never); Tobacco (Never); Working         Review of Systems  · Constitutional  o Denies  o : fever, chills, weight loss  · Cardiovascular  o Denies  o : chest pain, shortness of breath  · Gastrointestinal  o Denies  o : liver disease, heartburn, nausea, blood in stools  · Genitourinary  o Denies  o : painful urination, blood in urine  · Integument  o Denies  o : rash, itching  · Neurologic  o Denies  o : headache, weakness, loss of consciousness  · Musculoskeletal  o Admits  o : painful, swollen joints  · Psychiatric  o Denies  o :  drug/alcohol addiction, anxiety, depression      Vitals  Date Time BP Position Site L\R Cuff Size HR RR TEMP (F) WT  HT  BMI kg/m2 BSA m2 O2 Sat FR L/min FiO2 HC       02/10/2021 11:09 AM      74 - R   204lbs 16oz 6'   27.8 2.17 96 %            Physical Examination  · Constitutional  o Appearance  o : well developed, well-nourished, no obvious deformities present  · Head and Face  o Head  o :   § Inspection  § : normocephalic  o Face  o :   § Inspection  § : no facial lesions  · Eyes  o Conjunctivae  o : conjunctivae normal  o Sclerae  o : sclerae white  · Ears, Nose, Mouth and Throat  o Ears  o :   § External Ears  § : appearance within normal limits  § Hearing  § : intact  o Nose  o :   § External Nose  § : appearance normal  · Neck  o Inspection/Palpation  o : normal appearance  o Range of Motion  o : full range of motion  · Respiratory  o Respiratory Effort  o : breathing unlabored  o Inspection of Chest  o : normal appearance  o Auscultation of Lungs  o : no audible wheezing or rales  · Cardiovascular  o Heart  o : regular rate  · Gastrointestinal  o Abdominal Examination  o : soft and non-tender  · Skin and Subcutaneous Tissue  o General Inspection  o : intact, no rashes  · Psychiatric  o General  o : Alert and oriented x3  o Judgement and Insight  o : judgment and insight intact  o Mood and Affect  o : mood normal, affect appropriate  · Left Shoulder  o Inspection  o : Well approximated incisions. No signs of infection. All compartments soft and well perfused. Full ROM of elbow, wrist and digits.           Assessment  · Left Aftercare following surgery of the muskuloskeletal system     V54.81      Plan  · Medications  o Medications have been Reconciled  o Transition of Care or Provider Policy  · Instructions  o Reviewed the patient's Past Medical, Social, and Family history as well as the ROS at today's visit, no changes.  o Call or return if worsening symptoms.  o Continue sling. PROM exercises demonstrated.  Start PT in 2 weeks. Follow up 4 weeks.   o Electronically Identified Patient Education Materials Provided Electronically            Electronically Signed by: TAMMIE ManzoC -Author on February 10, 2021 01:23:59 PM

## 2021-05-14 NOTE — PROGRESS NOTES
Progress Note      Patient Name: Kiran Hensley Sr   Patient ID: [id number]   Sex: Male   YOB: 1965    Primary Care Provider: Maggie CARTAGENA   Referring Provider: Maggie CARTAGENA    Visit Date: March 12, 2021    Provider: Amanda Dailey PA-C   Location: [facility]   Location Address: [address] [place] [address]  [id number]   Location Phone: [phone]          Chief Complaint  · Left Shoulder Pain      History Of Present Illness  Kiran Hensley Sr is a 55 year old /White male who presents today to [facility].      He is s/p left SAD/DC, debridement of labrum, and mini open RCR 1/28/21 by Dr. Coleman. He is doing well.  He is compliant with sling. He states it is hard for him to relax in PT for PROM stretching. He states moderate pain.             Past Medical History  Arthritis; Hypertension; Reflux         Past Surgical History  *Metal Implant; Artificial Joints/Limbs; Colonoscopy; EGD; Joint Surgery; Knee replacement, left         Medication List  lisinopril 10 mg oral tablet         Allergy List  Adhesive Tape; Tape Adherent       Allergies Reconciled  Family Medical History  Stroke; Cancer, Unspecified; No family history of colorectal cancer; Family history of Arthritis         Social History  Alcohol Use; Caffeine (Current every day); lives with spouse; ; .; Recreational Drug Use (Never); Second hand smoke exposure (Never); Tobacco (Never); Working         Review of Systems  · Constitutional  o Denies  o : fever, chills, weight loss  · Cardiovascular  o Denies  o : chest pain, shortness of breath  · Gastrointestinal  o Denies  o : liver disease, heartburn, nausea, blood in stools  · Genitourinary  o Denies  o : painful urination, blood in urine  · Integument  o Denies  o : rash, itching  · Neurologic  o Denies  o : headache, weakness, loss of consciousness  · Musculoskeletal  o Admits  o : painful, swollen  joints  · Psychiatric  o Denies  o : drug/alcohol addiction, anxiety, depression      Vitals  Date Time BP Position Site L\R Cuff Size HR RR TEMP (F) WT  HT  BMI kg/m2 BSA m2 O2 Sat FR L/min FiO2 HC       03/12/2021 03:19 PM         212lbs 0oz 6'   28.75 2.21             Physical Examination  · Constitutional  o Appearance  o : well developed, well-nourished, no obvious deformities present  · Head and Face  o Head  o :   § Inspection  § : normocephalic  o Face  o :   § Inspection  § : no facial lesions  · Eyes  o Conjunctivae  o : conjunctivae normal  o Sclerae  o : sclerae white  · Ears, Nose, Mouth and Throat  o Ears  o :   § External Ears  § : appearance within normal limits  § Hearing  § : intact  o Nose  o :   § External Nose  § : appearance normal  · Neck  o Inspection/Palpation  o : normal appearance  o Range of Motion  o : full range of motion  · Respiratory  o Respiratory Effort  o : breathing unlabored  o Inspection of Chest  o : normal appearance  o Auscultation of Lungs  o : no audible wheezing or rales  · Cardiovascular  o Heart  o : regular rate  · Gastrointestinal  o Abdominal Examination  o : soft and non-tender  · Skin and Subcutaneous Tissue  o General Inspection  o : intact, no rashes  · Psychiatric  o General  o : Alert and oriented x3  o Judgement and Insight  o : judgment and insight intact  o Mood and Affect  o : mood normal, affect appropriate  · Left Shoulder  o Inspection  o : Well healed incisions. No signs of infection. Forward flexion 90 degrees. Abduction 90 degrees. ER 30 degrees. IR to greater trochanter. Strength decreased as expected. Neurovascularly intact.           Assessment  · Aftercare following surgery of the muskuloskeletal system     V54.81  · Left shoulder pain, unspecified chronicity     719.41/M25.512      Plan  · Medications  o Medications have been Reconciled  o Transition of Care or Provider Policy  · Instructions  o Reviewed the patient's Past Medical, Social, and  Family history as well as the ROS at today's visit, no changes.  o Call or return if worsening symptoms.  o Continue PT. Restrictions outlined for work. Follow up 6 weeks. Advised no lifting with left arm.   o Flexeril and Naproxen prescribed. He may call for new Percocet rx if no relief with the forementioned.   o Electronically Identified Patient Education Materials Provided Electronically            Electronically Signed by: TAMMIE ManzoC -Author on March 12, 2021 04:04:26 PM

## 2021-05-14 NOTE — PROGRESS NOTES
Progress Note      Patient Name: Kiran Hensley Sr   Patient ID: [id number]   Sex: Male   YOB: 1965    Primary Care Provider: Maggie CARTAGENA   Referring Provider: Maggie CARTAGENA    Visit Date: December 21, 2020    Provider: Puneet Coleman MD   Location: [facility]   Location Address: [address] [place] [address]  [id number]   Location Phone: [phone]          Chief Complaint  · Left shoulder pain       History Of Present Illness  iKran Hensley Sr is a 55 year old /White male who presents today to [facility].      Patient presents today with a follow-up of left shoulder pain. Patient presents today with MRI results of his left shoulder. Patient has a history of a large rotator cuff tear in his left shoulder. He was scheduled for a Left Shoulder Arthroscopy, Subacromial Decompression, Distal Claviculectomy, Rotator Cuff Repair and Biceps Tenodesis on 11/7/2019 but his insurance wouldn't cover it due to him not trying other treatments first. Patient has been living with rotator cuff tear for a year since then and has been doing well without any treatment. He wasn't experiencing any soreness or pain before the injury at work. Patient was able to do his job well and other activities of daily living with no problem. Patient was at work on 11/18/20, lifting an object and felt a huge pop in his shoulder causing an onset pain in his shoulder. Since the pop at work he has been having trouble with his shoulder ROM and a onset of weakness.       Past Medical History  Arthritis; Hypertension; Reflux         Past Surgical History  *Metal Implant; Artificial Joints/Limbs; Colonoscopy; EGD; Joint Surgery; Knee replacement, left         Medication List  lisinopril 10 mg oral tablet         Allergy List  Adhesive Tape; Tape Adherent       Allergies Reconciled  Family Medical History  Stroke; Cancer, Unspecified; No family history of colorectal cancer; Family history  of Arthritis         Social History  Alcohol Use (Current some day); Caffeine (Current every day); lives with spouse; ; .; Recreational Drug Use (Never); Second hand smoke exposure (Never); Tobacco (Never); Working         Immunizations  Name Date Admin   Influenza 11/05/2020   Influenza 11/05/2020         Review of Systems  · Constitutional  o Denies  o : fever, chills, weight loss  · Cardiovascular  o Denies  o : chest pain, shortness of breath  · Gastrointestinal  o Denies  o : liver disease, heartburn, nausea, blood in stools  · Genitourinary  o Denies  o : painful urination, blood in urine  · Integument  o Denies  o : rash, itching  · Neurologic  o Denies  o : headache, weakness, loss of consciousness  · Musculoskeletal  o Denies  o : painful, swollen joints  · Psychiatric  o Denies  o : drug/alcohol addiction, anxiety, depression      Vitals  Date Time BP Position Site L\R Cuff Size HR RR TEMP (F) WT  HT  BMI kg/m2 BSA m2 O2 Sat FR L/min FiO2        12/21/2020 02:05 PM      94 - R   204lbs 16oz 6'   27.8 2.17 98 %            Physical Examination  · Constitutional  o Appearance  o : well developed, well-nourished, no obvious deformities present  · Head and Face  o Head  o :   § Inspection  § : normocephalic  o Face  o :   § Inspection  § : no facial lesions  · Eyes  o Conjunctivae  o : conjunctivae normal  o Sclerae  o : sclerae white  · Ears, Nose, Mouth and Throat  o Ears  o :   § External Ears  § : appearance within normal limits  § Hearing  § : intact  o Nose  o :   § External Nose  § : appearance normal  · Neck  o Inspection/Palpation  o : normal appearance  o Range of Motion  o : full range of motion  · Respiratory  o Respiratory Effort  o : breathing unlabored  o Inspection of Chest  o : normal appearance  o Auscultation of Lungs  o : no audible wheezing or rales  · Cardiovascular  o Heart  o : regular rate  · Gastrointestinal  o Abdominal Examination  o : soft and non-tender  · Skin and  Subcutaneous Tissue  o General Inspection  o : intact, no rashes  · Psychiatric  o General  o : Alert and oriented x3  o Judgement and Insight  o : judgment and insight intact  o Mood and Affect  o : mood normal, affect appropriate  · Left Shoulder  o Inspection  o : Sensation grossly intact. Neurovascular intact. Skin intact. Radial pulse 2+, ulnar pulse 2+. Atrophy of the rotator cuff. No skin discoloration or swelling. Tender biceps tendon. Tender AC joint. Tender at greater tuberosity. Forward flexion to 110. IR to back pocket. Pain with cross body adduction. Pain with empty can testing. Positive [place]. Positive Neer's. Pain with drop arm testing. 2 out of 5 strength. Good flexion and extension of elbow.   · Imaging  o Imaging  o : 12/9/20 MRI: 1. Rotator cuff tendinosis with complete retracted tear of the supraspinatus tendon. This extends to high-grade partial articular sided tearing of the infraspinatus tendon. Partial articular sided tearing is also present within the superior to mid fibers of the subscapularis tendon. 2. Mild to moderate acromioclavicular and mild glenohumeral osteoarthritis. 3. Diminutive appearance of the biceps tendon which may be related to longitudinal tearing. A small amount of tenosynovitis is present. 4. Mild pan labral degenerative tearing, most pronounced along the posterior labrum which appears mildly displaced..           Assessment  · Left shoulder pain, unspecified chronicity     719.41/M25.512  · Rotator cuff tear of left shoulder     840.4/M75.100      Plan  · Medications  o Medications have been Reconciled  o Transition of Care or Provider Policy  · Instructions  o Dr. Coleman saw and examined the patient and agrees with plan.   o MRI reviewed by Dr. Coleman.  o Reviewed the patient's Past Medical, Social, and Family history as well as the ROS at today's visit, no changes.  o Call or return if worsening symptoms.  o Discussed surgery.  o Risks/benefits discussed with patient  including, but not limited to: infection, bleeding, neurovascular damage, malunion, nonunion, aesthetic deformity, need for further surgery, and death.  o Surgery pamphlet given.  o Follow Up Post-Op.  o This note was transcribed by Maria Ines Smith. darek quinn Discussed diagnosis and treatment options with the patient. Patient is a candidate for surgical intervention. We discussed at some point he can retract his rotator cuff to the point of no repair and will only be a candidate for a reverse total shoulder. We discussed different conservative management options. Patient wishes to proceed with a left total shoulder arthroscopy.            Electronically Signed by: Maria Ines Smith-, Other -Author on December 28, 2020 08:39:06 AM  Electronically Co-signed by: Puneet Coleman MD -Reviewer on January 4, 2021 07:46:17 PM

## 2021-05-14 NOTE — PROGRESS NOTES
Progress Note      Patient Name: Kiran Hensley Sr   Patient ID: [id number]   Sex: Male   YOB: 1965    Primary Care Provider: Maggie CARTAGENA   Referring Provider: Maggie CARTAGENA    Visit Date: April 9, 2021    Provider: Nestor Newell PA-C   Location: [facility]   Location Address: [address] [place] [address]  [id number]   Location Phone: [phone]          Chief Complaint  · Left Shoulder Pain      History Of Present Illness  Kiran Hensley Sr is a 55 year old /White male who presents today to [facility].      The patient presents here today for follow up evaluation of the left shoulder. The patient is S/P left shoulder arthroscopic SAD/DC, debridement of labrum and mini open rotator cuff repair 1/28/2021 by Dr. Coleman.  The patient states he is doing well and making great gains with physical therapy over the past couple of weeks.       Past Medical History  Arthritis; Hypertension; Reflux         Past Surgical History  *Metal Implant; Artificial Joints/Limbs; Colonoscopy; EGD; Joint Surgery; Knee replacement, left         Medication List  cyclobenzaprine 10 mg oral tablet; lisinopril 10 mg oral tablet; Naprosyn 500 mg oral tablet         Allergy List  Adhesive Tape; Tape Adherent       Allergies Reconciled  Family Medical History  Stroke; Cancer, Unspecified; No family history of colorectal cancer; Family history of Arthritis         Social History  Alcohol Use; Caffeine (Current every day); lives with spouse; ; .; Recreational Drug Use (Never); Second hand smoke exposure (Never); Tobacco (Never); Working         Review of Systems  · Constitutional  o Denies  o : fever, chills, weight loss  · Cardiovascular  o Denies  o : chest pain, shortness of breath  · Gastrointestinal  o Denies  o : liver disease, heartburn, nausea, blood in stools  · Genitourinary  o Denies  o : painful urination, blood in  urine  · Integument  o Denies  o : rash, itching  · Neurologic  o Denies  o : headache, weakness, loss of consciousness  · Musculoskeletal  o Denies  o : painful, swollen joints  · Psychiatric  o Denies  o : drug/alcohol addiction, anxiety, depression      Vitals  Date Time BP Position Site L\R Cuff Size HR RR TEMP (F) WT  HT  BMI kg/m2 BSA m2 O2 Sat FR L/min FiO2        04/09/2021 03:42 PM         210lbs 5oz 6'   28.52 2.2             Physical Examination  · Constitutional  o Appearance  o : well developed, well-nourished, no obvious deformities present  · Head and Face  o Head  o :   § Inspection  § : normocephalic  o Face  o :   § Inspection  § : no facial lesions  · Eyes  o Conjunctivae  o : conjunctivae normal  o Sclerae  o : sclerae white  · Ears, Nose, Mouth and Throat  o Ears  o :   § External Ears  § : appearance within normal limits  § Hearing  § : intact  o Nose  o :   § External Nose  § : appearance normal  · Neck  o Inspection/Palpation  o : normal appearance  o Range of Motion  o : full range of motion  · Respiratory  o Respiratory Effort  o : breathing unlabored  o Inspection of Chest  o : normal appearance  o Auscultation of Lungs  o : no audible wheezing or rales  · Cardiovascular  o Heart  o : regular rate  · Gastrointestinal  o Abdominal Examination  o : soft and non-tender  · Skin and Subcutaneous Tissue  o General Inspection  o : intact, no rashes  · Psychiatric  o General  o : Alert and oriented x3  o Judgement and Insight  o : judgment and insight intact  o Mood and Affect  o : mood normal, affect appropriate  · Left Shoulder  o Inspection  o : The left shoulder is anatomic and atraumatic with good healing of the arthroscopy incision sites with appropriate scar formation.  FE. 110 Abduction. ER 65. Neurovascularly intact.          Assessment  · Aftercare following left shoulder arthroscopic SAD/DC, debridement of labrum and mini open rotator cuff repair     V54.81  · Left shoulder  pain, unspecified chronicity     719.41/M25.512      Plan  · Medications  o Medications have been Reconciled  o Transition of Care or Provider Policy  · Instructions  o Reviewed the patient's Past Medical, Social, and Family history as well as the ROS at today's visit, no changes.  o Call or return if worsening symptoms.  o This note was transcribed by Shana Adams. ch/darek.  o Discussed the treatment plan with the patient. The patient is to begin strength training with physical therapy and as well continuing to work on ROM. Follow up in 6 weeks to recheck.   o Electronically Identified Patient Education Materials Provided Electronically            Electronically Signed by: Shana Adams MA -Author on April 12, 2021 03:35:34 PM  Electronically Co-signed by: Puneet Coleman MD -Reviewer on April 12, 2021 09:16:30 PM  Electronically Co-signed by: Nestor Newell PA-C -Reviewer on April 13, 2021 01:00:02 PM

## 2021-05-14 NOTE — PROGRESS NOTES
Progress Note      Patient Name: Kiran Hensley Sr   Patient ID: [id number]   Sex: Male   YOB: 1965    Primary Care Provider: Maggie CARTAGENA   Referring Provider: Maggie CARTAGENA    Visit Date: March 2, 2021    Provider: OSIEL Mena   Location: [facility]   Location Address: [address] [place] [address], [address]  [place], [place]  [id number]   Location Phone: [phone]          Chief Complaint  · F/U AFTER CARDIO      History Of Present Illness  Kiran Hensley Sr is a 55 year old /White male who presents for evaluation and treatment of:      Patient is here today for a follow up after seen Cardiologist.    Says he cancelled his appointment because pain between shoulder blades is gone and he has no problems. States he has not had any more pain since his last visit here. States he was busy at work and missed the appt. He does not feel he needs to see cardiology at this point.    he had a rotator cuff repair 1/28/21-they were unable to repair the bicep muscle-states it was too far gone. He is wearing a sling today in the office. States he went back to work part time. He is going to PT -yesterday was his second visit. States he is going 3x wk for 12 wks. He has a f/u with  3/12/21. States he was told he would get rid of the sling at that time. He is off the oxycodone states he was able to take Ibuprofen 600mg for relief. Admits shoulder pain 1 on scale 0-10.    htn-well controlled on lisinopril.     colonoscopy-9/19-WNL.           Past Medical History  Disease Name Date Onset Notes   Arthritis --  --    Hypertension --  --    Reflux --  --          Past Surgical History  Procedure Name Date Notes   *Metal Implant --  --    Artificial Joints/Limbs --  --    Colonoscopy 2019 --    EGD 2011 Dr. Monteiro   Joint Surgery --  --    Knee replacement, left --  --          Medication List  Name Date Started Instructions   lisinopril 10 mg oral tablet 12/04/2020 take  1 tablet (10 mg) by oral route once daily for 90 days         Allergy List  Allergen Name Date Reaction Notes   Adhesive Tape --  --  --    Tape Adherent --  --  --          Family Medical History  Disease Name Relative/Age Notes   Stroke Uncle/   --    Cancer, Unspecified Grandfather (paternal)/  Grandmother (paternal)/   --    No family history of colorectal cancer  --    Family history of Arthritis Father/  Mother/   Mother; Father         Social History  Finding Status Start/Stop Quantity Notes   Alcohol Use --  --/-- --  03/02/2021 - rarely drinks   Caffeine Current every day 0/0 --  drinks regularly; coffee, tea and soft drinks; 3-4 times per day   lives with spouse --  --/-- --  --     --  --/-- --  --    . --  --/-- --  --    Recreational Drug Use Never --/-- --  no   Second hand smoke exposure Never 0/0 --  no   Tobacco Never --/-- --  never smoker  never smoker  never a smoker   Working --  --/-- --  --          Immunizations  NameDate Admin Mfg Trade Name Lot Number Route Inj VIS Given VIS Publication   [id number]/05/2020 [facility] Fluzone Quadrivalent [id number] IM LD 11/05/2020 08/15/2019   Comments:          Review of Systems  · Constitutional  o Denies  o : fever, fatigue, weight loss, weight gain  · Cardiovascular  o Denies  o : lower extremity edema, claudication, chest pressure, palpitations  · Respiratory  o Denies  o : shortness of breath, wheezing, cough, hemoptysis, dyspnea on exertion  · Gastrointestinal  o Denies  o : nausea, vomiting, diarrhea, constipation, abdominal pain  · Musculoskeletal  o Admits  o : shoulder pain      Vitals  Date Time BP Position Site L\R Cuff Size HR RR TEMP (F) WT  HT  BMI kg/m2 BSA m2 O2 Sat FR L/min FiO2 HC       03/02/2021 08:27 /79 Sitting    75 - R   212lbs 6oz 6'   28.8 2.21 98 %            Physical Examination  · Constitutional  o Appearance  o : alert, in no acute distress, well developed, well-nourished  · Neck  o Thyroid  o : no thyomegaly,  no palpabale masses   · Respiratory  o Auscultation of Lungs  o : normal breath sounds throughout, no wheeze, rhonchi, or crackles  · Cardiovascular  o Heart  o : Regular rate and rhythm, Normal S1,S2 , No cardiac murmers, No S3 or S4 gallop or rubs  · Skin and Subcutaneous Tissue  o General Inspection  o : no rashes, normal skin color, warm and dry  o Digits and Nails  o : no clubbing, cyanosis, deformities or edema present, normal appearing nails  · Neurologic  o Mental Status Examination  o : alert and oriented to time, place, and person. Gait and Station: normal gait, able to stand without difficulty  · Psychiatric  o Judgement and Insight  o : judgment and insight intact  o Mood and Affect  o : normal mood and affect          Assessment  · Essential hypertension     401.9/I10  · Rotator cuff arthropathy of left shoulder     716.81/M12.812      Plan  · Orders  o ACO-14: Influenza immunization administered or previously received [facility] () - - 03/02/2021  o ACO-19: Colorectal cancer screening results documented and reviewed (3017F) - - 09/27/2019  o ACO-39: Current medications updated and reviewed (1159F, ) - - 03/02/2021  · Medications  o Medications have been Reconciled  o Transition of Care or Provider Policy  · Instructions  o Patient advised to monitor blood pressure (B/P) at home and journal readings. Patient informed that a B/P reading at home of more than 130/80 is considered hypertension. For readings greater urcz004/90 or higher patient is advised to follow up in the office with readings for management. Patient advised to limit sodium intake.  o Take all medications as prescribed/directed.  o Patient was educated/instructed on their diagnosis, treatment and medications prior to discharge from the clinic today.  o Patient instructed to seek medical attention urgently for new or worsening symptoms.  o Call the office with any concerns or questions.  o 6 month follow up  o Electronically Identified  Patient Education Materials Provided Electronically  · Disposition  o Call or Return if symptoms worsen or persist.            Electronically Signed by: OSIEL Mena -Author on March 2, 2021 09:59:56 AM

## 2021-05-15 VITALS
HEART RATE: 68 BPM | BODY MASS INDEX: 28.38 KG/M2 | WEIGHT: 209.5 LBS | DIASTOLIC BLOOD PRESSURE: 90 MMHG | SYSTOLIC BLOOD PRESSURE: 135 MMHG | HEIGHT: 72 IN | OXYGEN SATURATION: 100 %

## 2021-05-15 VITALS
WEIGHT: 202.37 LBS | OXYGEN SATURATION: 99 % | SYSTOLIC BLOOD PRESSURE: 125 MMHG | HEART RATE: 66 BPM | HEIGHT: 72 IN | DIASTOLIC BLOOD PRESSURE: 78 MMHG | BODY MASS INDEX: 27.41 KG/M2

## 2021-05-15 VITALS
WEIGHT: 199.37 LBS | HEART RATE: 62 BPM | BODY MASS INDEX: 27 KG/M2 | OXYGEN SATURATION: 99 % | HEIGHT: 72 IN | SYSTOLIC BLOOD PRESSURE: 124 MMHG | DIASTOLIC BLOOD PRESSURE: 72 MMHG

## 2021-05-15 VITALS — WEIGHT: 200.37 LBS | BODY MASS INDEX: 27.14 KG/M2 | HEART RATE: 70 BPM | HEIGHT: 72 IN | OXYGEN SATURATION: 98 %

## 2021-05-24 ENCOUNTER — OFFICE VISIT CONVERTED (OUTPATIENT)
Dept: ORTHOPEDIC SURGERY | Facility: CLINIC | Age: 56
End: 2021-05-24
Attending: PHYSICIAN ASSISTANT

## 2021-06-05 NOTE — PROGRESS NOTES
Progress Note      Patient Name: Kiran Hensley Sr   Patient ID: [id number]   Sex: Male   YOB: 1965    Primary Care Provider: Maggie CARTAGENA   Referring Provider: Maggie CARTAGENA    Visit Date: May 24, 2021    Provider: Nestor Newell PA-C   Location: [facility]   Location Address: [address] [place] [address]  [id number]   Location Phone: [phone]          Chief Complaint  · Follow up left shoulder arthroscopy      History Of Present Illness  Kiran Hensley Sr is a 55 year old /White male who presents today to [facility].      Patient follows-up today status-post left shoulder arthroscopy with mini open RCR, SAD/DC and debridement of labrum performed on 1/28/21 by Dr. Coleman. Patient states he has been making good gains with physical therapy.       Past Medical History  Arthritis; Hypertension; Reflux         Past Surgical History  *Metal Implant; Artificial Joints/Limbs; Colonoscopy; EGD; Joint Surgery; Knee replacement, left         Medication List  cyclobenzaprine 10 mg oral tablet; lisinopril 10 mg oral tablet; Naprosyn 500 mg oral tablet         Allergy List  Adhesive Tape; Tape Adherent         Family Medical History  Stroke; Cancer, Unspecified; No family history of colorectal cancer; Family history of Arthritis         Social History  Alcohol Use; Caffeine (Current every day); lives with spouse; ; .; Recreational Drug Use (Never); Second hand smoke exposure (Never); Tobacco (Never); Working         Review of Systems  · Constitutional  o Denies  o : fever, chills, weight loss  · Cardiovascular  o Denies  o : chest pain, shortness of breath  · Gastrointestinal  o Denies  o : liver disease, heartburn, nausea, blood in stools  · Genitourinary  o Denies  o : painful urination, blood in urine  · Integument  o Denies  o : rash, itching  · Neurologic  o Denies  o : headache, weakness, loss of  consciousness  · Musculoskeletal  o Denies  o : painful, swollen joints  · Psychiatric  o Denies  o : drug/alcohol addiction, anxiety, depression      Vitals  Date Time BP Position Site L\R Cuff Size HR RR TEMP (F) WT  HT  BMI kg/m2 BSA m2 O2 Sat FR L/min FiO2 HC       05/24/2021 09:44 AM      68 - R   210lbs 0oz 6'   28.48 2.2 96 %            Physical Examination  · Constitutional  o Appearance  o : well developed, well-nourished, no obvious deformities present  · Head and Face  o Head  o :   § Inspection  § : normocephalic  o Face  o :   § Inspection  § : no facial lesions  · Eyes  o Conjunctivae  o : conjunctivae normal  o Sclerae  o : sclerae white  · Ears, Nose, Mouth and Throat  o Ears  o :   § External Ears  § : appearance within normal limits  § Hearing  § : intact  o Nose  o :   § External Nose  § : appearance normal  · Neck  o Inspection/Palpation  o : normal appearance  o Range of Motion  o : full range of motion  · Respiratory  o Respiratory Effort  o : breathing unlabored  o Inspection of Chest  o : normal appearance  o Auscultation of Lungs  o : no audible wheezing or rales  · Cardiovascular  o Heart  o : regular rate  · Gastrointestinal  o Abdominal Examination  o : soft and non-tender  · Skin and Subcutaneous Tissue  o General Inspection  o : intact, no rashes  · Psychiatric  o General  o : Alert and oriented x3  o Judgement and Insight  o : judgment and insight intact  o Mood and Affect  o : mood normal, affect appropriate  · Left Shoulder  o Inspection  o : Surgical site appreciate the healing well with good scar formation that is supple. Range of motion is appreciated to be almost full and equal compared to the contralateral side with full and equal strength compared to the contralateral side. Neurovascularly intact distally.           Assessment  · Left shoulder arthroscopy-Aftercare following surgery of the muskuloskeletal system     V54.81  · Decreased range of motion of left  shoulder     719.51/M25.612      Plan  · Medications  o Medications have been Reconciled  o Transition of Care or Provider Policy  · Instructions  o Reviewed the patient's Past Medical, Social, and Family history as well as the ROS at today's visit, no changes.  o Call or return if worsening symptoms.  o This note is transcribed by Priscila Gonzalez ch/darek  o Patient may return to work full duty in a progressive manner as well as progressively return to all his regular activities. Patient is to follow-up in 6 weeks to make sure he is healing appropriately at which point anticipate he can follow-up on as needed basis thereafter. He states his understanding and in agreement with this plan.             Electronically Signed by: Priscila Gonzalez, -Author on May 25, 2021 02:25:11 PM  Electronically Co-signed by: Nestor Newell PA-C -Reviewer on May 25, 2021 04:20:41 PM

## 2021-07-07 ENCOUNTER — OFFICE VISIT (OUTPATIENT)
Dept: ORTHOPEDIC SURGERY | Facility: CLINIC | Age: 56
End: 2021-07-07

## 2021-07-07 VITALS — OXYGEN SATURATION: 99 % | WEIGHT: 213.8 LBS | HEART RATE: 74 BPM | HEIGHT: 72 IN | BODY MASS INDEX: 28.96 KG/M2

## 2021-07-07 DIAGNOSIS — G89.29 CHRONIC LEFT SHOULDER PAIN: Primary | ICD-10-CM

## 2021-07-07 DIAGNOSIS — M25.512 CHRONIC LEFT SHOULDER PAIN: Primary | ICD-10-CM

## 2021-07-07 PROCEDURE — 99212 OFFICE O/P EST SF 10 MIN: CPT | Performed by: PHYSICIAN ASSISTANT

## 2021-07-07 RX ORDER — LISINOPRIL 10 MG/1
TABLET ORAL
COMMUNITY
Start: 2021-04-29 | End: 2021-09-02

## 2021-07-07 NOTE — PROGRESS NOTES
"Chief Complaint  Follow-up of the Left Shoulder    Subjective          Kiran Hensley Sr presents to [facility] for left shoulder pain.  He status post left shoulder arthroscopy with mini open RCR, SCD/DCE and debridement of labrum performed 1/28/2021 by Dr. Coleman.  He says he is doing well, no pain, does not take anything for pain relief.  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  He states at max he lifts 50 pounds and has no pain with this.  He is not doing PT or home exercises at this point.    Objective   Vital Signs:   Pulse 74   Ht 182.9 cm (72\")   Wt 97 kg (213 lb 12.8 oz)   SpO2 99%   BMI 29.00 kg/m²       Physical Exam  Constitutional:       Appearance: Normal appearance. He is well-developed and normal weight.   HENT:      Head: Normocephalic.      Right Ear: Hearing and external ear normal.      Left Ear: Hearing and external ear normal.      Nose: Nose normal.   Eyes:      Conjunctiva/sclera: Conjunctivae normal.   Cardiovascular:      Rate and Rhythm: Normal rate.   Pulmonary:      Effort: Pulmonary effort is normal.      Breath sounds: No wheezing or rales.   Abdominal:      Palpations: Abdomen is soft.      Tenderness: There is no abdominal tenderness.   Musculoskeletal:      Cervical back: Normal range of motion.   Skin:     Findings: No rash.   Neurological:      Mental Status: He is alert and oriented to person, place, and time.   Psychiatric:         Mood and Affect: Mood and affect normal.         Judgment: Judgment normal.     Ortho Exam  Left shoulder: Skin intact, no swelling, no tenderness to palpation.  Patient has full active flexion, extension and internal rotation to T12.  Sensation to light touch is intact.  Full range of motion left elbow wrist and digits on the extremity.  Radial pulses 2+.  Strength 5 out of 5 in bilateral shoulders with resisted flexion and extension.  Result Review :            Imaging Results (Most Recent)     None       "          Assessment and Plan    Problem List Items Addressed This Visit        Musculoskeletal and Injuries    Chronic left shoulder pain - Primary    Current Assessment & Plan     Patient is doing very well, he is over 5 months postop he has , returned back to work and has no pain.  He will follow up as needed in the future.                Follow Up   Return if symptoms worsen or fail to improve.  Patient Instructions   Follow-up as needed    Patient was given instructions and counseling regarding his condition or for health maintenance advice. Please see specific information pulled into the AVS if appropriate.

## 2021-07-07 NOTE — ASSESSMENT & PLAN NOTE
Patient is doing very well, he is over 5 months postop he has , returned back to work and has no pain.  He will follow up as needed in the future.

## 2021-07-15 VITALS — OXYGEN SATURATION: 96 % | BODY MASS INDEX: 28.44 KG/M2 | HEIGHT: 72 IN | WEIGHT: 210 LBS | HEART RATE: 68 BPM

## 2021-07-15 RX ORDER — NAPROXEN 500 MG/1
500 TABLET ORAL 2 TIMES DAILY WITH MEALS
COMMUNITY
End: 2021-07-20

## 2021-07-15 RX ORDER — CYCLOBENZAPRINE HCL 10 MG
10 TABLET ORAL 3 TIMES DAILY PRN
COMMUNITY
End: 2021-07-20

## 2021-07-20 ENCOUNTER — LAB (OUTPATIENT)
Dept: LAB | Facility: HOSPITAL | Age: 56
End: 2021-07-20

## 2021-07-20 ENCOUNTER — OFFICE VISIT (OUTPATIENT)
Dept: FAMILY MEDICINE CLINIC | Facility: CLINIC | Age: 56
End: 2021-07-20

## 2021-07-20 VITALS
HEIGHT: 72 IN | OXYGEN SATURATION: 100 % | WEIGHT: 210.4 LBS | DIASTOLIC BLOOD PRESSURE: 77 MMHG | BODY MASS INDEX: 28.5 KG/M2 | RESPIRATION RATE: 16 BRPM | SYSTOLIC BLOOD PRESSURE: 123 MMHG | HEART RATE: 62 BPM

## 2021-07-20 DIAGNOSIS — R10.31 RIGHT LOWER QUADRANT ABDOMINAL PAIN: Primary | ICD-10-CM

## 2021-07-20 DIAGNOSIS — R10.31 RIGHT GROIN PAIN: ICD-10-CM

## 2021-07-20 LAB
ALBUMIN SERPL-MCNC: 4.4 G/DL (ref 3.5–5.2)
ALBUMIN/GLOB SERPL: 1.8 G/DL
ALP SERPL-CCNC: 78 U/L (ref 39–117)
ALT SERPL W P-5'-P-CCNC: 12 U/L (ref 1–41)
ANION GAP SERPL CALCULATED.3IONS-SCNC: 11.2 MMOL/L (ref 5–15)
AST SERPL-CCNC: 17 U/L (ref 1–40)
BASOPHILS # BLD AUTO: 0.07 10*3/MM3 (ref 0–0.2)
BASOPHILS NFR BLD AUTO: 1.4 % (ref 0–1.5)
BILIRUB SERPL-MCNC: 0.6 MG/DL (ref 0–1.2)
BUN SERPL-MCNC: 12 MG/DL (ref 6–20)
BUN/CREAT SERPL: 9.4 (ref 7–25)
CALCIUM SPEC-SCNC: 9 MG/DL (ref 8.6–10.5)
CHLORIDE SERPL-SCNC: 103 MMOL/L (ref 98–107)
CO2 SERPL-SCNC: 23.8 MMOL/L (ref 22–29)
CREAT SERPL-MCNC: 1.28 MG/DL (ref 0.76–1.27)
DEPRECATED RDW RBC AUTO: 41 FL (ref 37–54)
EOSINOPHIL # BLD AUTO: 0.19 10*3/MM3 (ref 0–0.4)
EOSINOPHIL NFR BLD AUTO: 3.9 % (ref 0.3–6.2)
ERYTHROCYTE [DISTWIDTH] IN BLOOD BY AUTOMATED COUNT: 13.9 % (ref 12.3–15.4)
GFR SERPL CREATININE-BSD FRML MDRD: 58 ML/MIN/1.73
GLOBULIN UR ELPH-MCNC: 2.4 GM/DL
GLUCOSE SERPL-MCNC: 81 MG/DL (ref 65–99)
HCT VFR BLD AUTO: 40.1 % (ref 37.5–51)
HGB BLD-MCNC: 13.3 G/DL (ref 13–17.7)
IMM GRANULOCYTES # BLD AUTO: 0.02 10*3/MM3 (ref 0–0.05)
IMM GRANULOCYTES NFR BLD AUTO: 0.4 % (ref 0–0.5)
LYMPHOCYTES # BLD AUTO: 1.14 10*3/MM3 (ref 0.7–3.1)
LYMPHOCYTES NFR BLD AUTO: 23.2 % (ref 19.6–45.3)
MCH RBC QN AUTO: 27.2 PG (ref 26.6–33)
MCHC RBC AUTO-ENTMCNC: 33.2 G/DL (ref 31.5–35.7)
MCV RBC AUTO: 82 FL (ref 79–97)
MONOCYTES # BLD AUTO: 0.59 10*3/MM3 (ref 0.1–0.9)
MONOCYTES NFR BLD AUTO: 12 % (ref 5–12)
NEUTROPHILS NFR BLD AUTO: 2.91 10*3/MM3 (ref 1.7–7)
NEUTROPHILS NFR BLD AUTO: 59.1 % (ref 42.7–76)
NRBC BLD AUTO-RTO: 0 /100 WBC (ref 0–0.2)
PLATELET # BLD AUTO: 265 10*3/MM3 (ref 140–450)
PMV BLD AUTO: 11.5 FL (ref 6–12)
POTASSIUM SERPL-SCNC: 4.5 MMOL/L (ref 3.5–5.2)
PROT SERPL-MCNC: 6.8 G/DL (ref 6–8.5)
RBC # BLD AUTO: 4.89 10*6/MM3 (ref 4.14–5.8)
SODIUM SERPL-SCNC: 138 MMOL/L (ref 136–145)
WBC # BLD AUTO: 4.92 10*3/MM3 (ref 3.4–10.8)

## 2021-07-20 PROCEDURE — 99214 OFFICE O/P EST MOD 30 MIN: CPT | Performed by: NURSE PRACTITIONER

## 2021-07-20 PROCEDURE — 85025 COMPLETE CBC W/AUTO DIFF WBC: CPT | Performed by: NURSE PRACTITIONER

## 2021-07-20 PROCEDURE — 80053 COMPREHEN METABOLIC PANEL: CPT | Performed by: NURSE PRACTITIONER

## 2021-07-20 NOTE — PROGRESS NOTES
"Chief Complaint  Abdominal Pain (LQ)    Subjective          Kiran Hensley Sr presents to [facility] for   History of Present Illness    Patient is here with complaints of lower abdominal pain/groin pain x 2-3 months. Patient denies any nausea, vomiting, diarrhea. Patient states the pain is worse in the morning and feels somwehat like a pulled muscle, but states it is not tender to touch. . Denies any change in bowel habits. States pain seems to be worse in the mornings then lessens as the day goes on. States he thought it may be from his belt because he has gained a little weight. \    Medical History  Past Medical History:   Diagnosis Date   • Acid reflux    • Arthritis    • Hypertension      Surgical History  Past Surgical History:   Procedure Laterality Date   • ANKLE SURGERY     • COLONOSCOPY  2019   • ENDOSCOPY  2011    Dr. Monteiro   • LASER ABLATION     • OTHER SURGICAL HISTORY      metal implants   • TOTAL KNEE ARTHROPLASTY       Social History  Social History     Socioeconomic History   • Marital status:      Spouse name: Not on file   • Number of children: Not on file   • Years of education: Not on file   • Highest education level: Not on file   Tobacco Use   • Smoking status: Never Smoker   • Smokeless tobacco: Never Used   Vaping Use   • Vaping Use: Never used   Substance and Sexual Activity   • Alcohol use: Yes     Comment: rarely    • Drug use: Never   • Sexual activity: Defer       Current Outpatient Medications:   •  lisinopril (PRINIVIL,ZESTRIL) 10 MG tablet, , Disp: , Rfl:     Review of Systems     Objective     /77 (BP Location: Left arm, Patient Position: Sitting, Cuff Size: Adult)   Pulse 62   Resp 16   Ht 182.9 cm (72\")   Wt 95.4 kg (210 lb 6.4 oz)   SpO2 100%   BMI 28.54 kg/m²     Body mass index is 28.54 kg/m².    Physical Exam  Vitals reviewed.   Constitutional:       Appearance: Normal appearance. He is well-developed.   HENT:      Head: " Normocephalic and atraumatic.      Right Ear: External ear normal.      Left Ear: External ear normal.      Mouth/Throat:      Pharynx: No oropharyngeal exudate.   Eyes:      Conjunctiva/sclera: Conjunctivae normal.      Pupils: Pupils are equal, round, and reactive to light.   Cardiovascular:      Rate and Rhythm: Normal rate and regular rhythm.      Heart sounds: No murmur heard.   No friction rub. No gallop.    Pulmonary:      Effort: Pulmonary effort is normal.      Breath sounds: Normal breath sounds. No wheezing or rhonchi.   Abdominal:      General: Bowel sounds are normal. There is no distension.      Palpations: Abdomen is soft. There is no mass.      Tenderness: There is no abdominal tenderness.      Hernia: No hernia is present.   Skin:     General: Skin is warm and dry.   Neurological:      Mental Status: He is alert and oriented to person, place, and time.      Cranial Nerves: No cranial nerve deficit.   Psychiatric:         Mood and Affect: Mood and affect normal.         Behavior: Behavior normal.         Thought Content: Thought content normal.         Judgment: Judgment normal.         Result Review :     The following data was reviewed by: OSIEL Weiss on 07/20/2021:                         Assessment:  Diagnoses and all orders for this visit:    1. Right lower quadrant abdominal pain (Primary)  -     CBC Auto Differential  -     Comprehensive Metabolic Panel  -     US Abdomen Limited; Future    2. Right groin pain  -     US Abdomen Limited; Future    Other orders  -     Cancel: PSA Screen        Plan:     Labs and ultrasound as above to evaluate complaints.          Follow Up     No follow-ups on file.    Patient was given instructions and counseling regarding his condition or for health maintenance advice. Please see specific information pulled into the AVS if appropriate.     OSIEL Weiss  07/20/2021

## 2021-07-21 ENCOUNTER — HOSPITAL ENCOUNTER (OUTPATIENT)
Dept: ULTRASOUND IMAGING | Facility: HOSPITAL | Age: 56
Discharge: HOME | End: 2021-07-21
Admitting: NURSE PRACTITIONER

## 2021-07-21 DIAGNOSIS — R10.31 RIGHT GROIN PAIN: ICD-10-CM

## 2021-07-21 DIAGNOSIS — R10.31 RIGHT LOWER QUADRANT ABDOMINAL PAIN: ICD-10-CM

## 2021-07-21 PROCEDURE — 76705 ECHO EXAM OF ABDOMEN: CPT

## 2021-07-22 ENCOUNTER — TELEPHONE (OUTPATIENT)
Dept: FAMILY MEDICINE CLINIC | Facility: CLINIC | Age: 56
End: 2021-07-22

## 2021-07-22 NOTE — TELEPHONE ENCOUNTER
Noted, ultrasound was normal, recommend patient follow-up with PCP for further evaluation if pain continues.

## 2021-07-26 ENCOUNTER — TELEPHONE (OUTPATIENT)
Dept: FAMILY MEDICINE CLINIC | Facility: CLINIC | Age: 56
End: 2021-07-26

## 2021-09-01 ENCOUNTER — LAB (OUTPATIENT)
Dept: LAB | Facility: HOSPITAL | Age: 56
End: 2021-09-01

## 2021-09-01 DIAGNOSIS — R79.89 ELEVATED SERUM CREATININE: ICD-10-CM

## 2021-09-01 DIAGNOSIS — R79.89 ELEVATED SERUM CREATININE: Primary | ICD-10-CM

## 2021-09-01 LAB
ANION GAP SERPL CALCULATED.3IONS-SCNC: 10.8 MMOL/L (ref 5–15)
BUN SERPL-MCNC: 10 MG/DL (ref 6–20)
BUN/CREAT SERPL: 10.1 (ref 7–25)
CALCIUM SPEC-SCNC: 9.8 MG/DL (ref 8.6–10.5)
CHLORIDE SERPL-SCNC: 102 MMOL/L (ref 98–107)
CO2 SERPL-SCNC: 26.2 MMOL/L (ref 22–29)
CREAT SERPL-MCNC: 0.99 MG/DL (ref 0.76–1.27)
GFR SERPL CREATININE-BSD FRML MDRD: 78 ML/MIN/1.73
GLUCOSE SERPL-MCNC: 87 MG/DL (ref 65–99)
POTASSIUM SERPL-SCNC: 4.4 MMOL/L (ref 3.5–5.2)
SODIUM SERPL-SCNC: 139 MMOL/L (ref 136–145)

## 2021-09-01 PROCEDURE — 36415 COLL VENOUS BLD VENIPUNCTURE: CPT

## 2021-09-01 PROCEDURE — 80048 BASIC METABOLIC PNL TOTAL CA: CPT

## 2021-09-02 ENCOUNTER — OFFICE VISIT (OUTPATIENT)
Dept: FAMILY MEDICINE CLINIC | Facility: CLINIC | Age: 56
End: 2021-09-02

## 2021-09-02 VITALS
HEART RATE: 71 BPM | DIASTOLIC BLOOD PRESSURE: 75 MMHG | BODY MASS INDEX: 28.85 KG/M2 | WEIGHT: 213 LBS | OXYGEN SATURATION: 100 % | HEIGHT: 72 IN | SYSTOLIC BLOOD PRESSURE: 123 MMHG

## 2021-09-02 DIAGNOSIS — N28.9 RENAL INSUFFICIENCY: ICD-10-CM

## 2021-09-02 DIAGNOSIS — I10 ESSENTIAL HYPERTENSION: Primary | ICD-10-CM

## 2021-09-02 PROBLEM — M19.90 ARTHRITIS: Status: ACTIVE | Noted: 2021-09-02

## 2021-09-02 PROBLEM — K21.9 ACID REFLUX: Status: ACTIVE | Noted: 2021-09-02

## 2021-09-02 PROCEDURE — 99213 OFFICE O/P EST LOW 20 MIN: CPT | Performed by: NURSE PRACTITIONER

## 2021-09-08 ENCOUNTER — TELEPHONE (OUTPATIENT)
Dept: FAMILY MEDICINE CLINIC | Facility: CLINIC | Age: 56
End: 2021-09-08

## 2021-10-15 ENCOUNTER — OFFICE VISIT (OUTPATIENT)
Dept: FAMILY MEDICINE CLINIC | Facility: CLINIC | Age: 56
End: 2021-10-15

## 2021-10-15 VITALS — RESPIRATION RATE: 16 BRPM | OXYGEN SATURATION: 96 % | HEART RATE: 88 BPM | TEMPERATURE: 97.8 F

## 2021-10-15 DIAGNOSIS — J06.9 UPPER RESPIRATORY TRACT INFECTION, UNSPECIFIED TYPE: ICD-10-CM

## 2021-10-15 DIAGNOSIS — Z20.822 EXPOSURE TO COVID-19 VIRUS: Primary | ICD-10-CM

## 2021-10-15 LAB
EXPIRATION DATE: NORMAL
EXPIRATION DATE: NORMAL
FLUAV AG NPH QL: NEGATIVE
FLUBV AG NPH QL: NEGATIVE
INTERNAL CONTROL: NORMAL
INTERNAL CONTROL: NORMAL
Lab: NORMAL
Lab: NORMAL
S PYO AG THROAT QL: NEGATIVE
SARS-COV-2 RNA RESP QL NAA+PROBE: NOT DETECTED

## 2021-10-15 PROCEDURE — 87880 STREP A ASSAY W/OPTIC: CPT | Performed by: NURSE PRACTITIONER

## 2021-10-15 PROCEDURE — U0003 INFECTIOUS AGENT DETECTION BY NUCLEIC ACID (DNA OR RNA); SEVERE ACUTE RESPIRATORY SYNDROME CORONAVIRUS 2 (SARS-COV-2) (CORONAVIRUS DISEASE [COVID-19]), AMPLIFIED PROBE TECHNIQUE, MAKING USE OF HIGH THROUGHPUT TECHNOLOGIES AS DESCRIBED BY CMS-2020-01-R: HCPCS | Performed by: NURSE PRACTITIONER

## 2021-10-15 PROCEDURE — 99213 OFFICE O/P EST LOW 20 MIN: CPT | Performed by: NURSE PRACTITIONER

## 2021-10-15 PROCEDURE — 87804 INFLUENZA ASSAY W/OPTIC: CPT | Performed by: NURSE PRACTITIONER

## 2021-10-15 RX ORDER — AMOXICILLIN AND CLAVULANATE POTASSIUM 875; 125 MG/1; MG/1
1 TABLET, FILM COATED ORAL 2 TIMES DAILY
Qty: 20 TABLET | Refills: 0 | Status: SHIPPED | OUTPATIENT
Start: 2021-10-15 | End: 2021-10-25

## 2021-10-15 NOTE — PROGRESS NOTES
Chief Complaint  Sore Throat and SINUS DRAINAGE    Subjective          Kiran Hensley Sr presents to [facility]  History of Present Illness  C/o sore throat x 7 days-admits he has been having a great deal of drainage and now has started coughing up yellow secretions. Denies fever, chills, body aches, loss of taste or smell, h/a, SOA, sinus pain/pressure, or ear pain. He has not been vaccinated against COVID 19. Denies sick contacts.   He  has a past medical history of Acid reflux, Arthritis, and Hypertension.     Objective   Vital Signs:   Pulse 88   Temp 97.8 °F (36.6 °C)   Resp 16   SpO2 96%     Physical Exam  Constitutional:       Appearance: Normal appearance.   HENT:      Right Ear: Hearing, ear canal and external ear normal. A middle ear effusion is present.      Left Ear: Hearing, tympanic membrane, ear canal and external ear normal.      Mouth/Throat:      Lips: Pink.      Mouth: Mucous membranes are moist.      Pharynx: Uvula midline. Posterior oropharyngeal erythema present.      Tonsils: No tonsillar exudate or tonsillar abscesses.   Cardiovascular:      Rate and Rhythm: Normal rate and regular rhythm.      Pulses: Normal pulses.      Heart sounds: Normal heart sounds.   Pulmonary:      Effort: Pulmonary effort is normal.      Breath sounds: Normal breath sounds.   Neurological:      Mental Status: He is alert.   Psychiatric:         Mood and Affect: Mood normal.         Behavior: Behavior normal.        Result Review :{Labs  Result Review  Imaging  Med Tab  Media  Procedures            Current Outpatient Medications:   •  amoxicillin-clavulanate (Augmentin) 875-125 MG per tablet, Take 1 tablet by mouth 2 (Two) Times a Day for 10 days., Disp: 20 tablet, Rfl: 0   Assessment and Plan    Diagnoses and all orders for this visit:    1. Exposure to COVID-19 virus (Primary)  -     POCT rapid strep A  -     COVID-19,CEPHEID/PAYAL/BDMAX,COR/MISHEL/PAD/PAM IN-HOUSE(OR  EMERGENT/ADD-ON),NP SWAB IN TRANSPORT MEDIA 3-4 HR TAT, RT-PCR - Swab, Nasopharynx; Future  -     POCT Influenza A/B  -     COVID-19,CEPHEID/PAYAL/BDMAX,COR/MISHEL/PAD/PAM IN-HOUSE(OR EMERGENT/ADD-ON),NP SWAB IN TRANSPORT MEDIA 3-4 HR TAT, RT-PCR - Swab, Nasopharynx    2. Upper respiratory tract infection, unspecified type  -     POCT rapid strep A  -     COVID-19,CEPHEID/PAYAL/BDMAX,COR/MISHEL/PAD/PAM IN-HOUSE(OR EMERGENT/ADD-ON),NP SWAB IN TRANSPORT MEDIA 3-4 HR TAT, RT-PCR - Swab, Nasopharynx; Future  -     POCT Influenza A/B  -     COVID-19,CEPHEID/PAYAL/BDMAX,COR/MISHEL/PAD/PAM IN-HOUSE(OR EMERGENT/ADD-ON),NP SWAB IN TRANSPORT MEDIA 3-4 HR TAT, RT-PCR - Swab, Nasopharynx  -     amoxicillin-clavulanate (Augmentin) 875-125 MG per tablet; Take 1 tablet by mouth 2 (Two) Times a Day for 10 days.  Dispense: 20 tablet; Refill: 0        Follow Up   Return if symptoms worsen or fail to improve.  Patient was given instructions and counseling regarding his condition or for health maintenance advice. Please see specific information pulled into the AVS if appropriate.     Kiran Hensley Sr  reports that he has never smoked. He has never used smokeless tobacco..              Maggie Chavez APRN

## 2022-02-15 ENCOUNTER — OFFICE VISIT (OUTPATIENT)
Dept: FAMILY MEDICINE CLINIC | Facility: CLINIC | Age: 57
End: 2022-02-15

## 2022-02-15 VITALS
DIASTOLIC BLOOD PRESSURE: 78 MMHG | OXYGEN SATURATION: 99 % | BODY MASS INDEX: 29.55 KG/M2 | HEART RATE: 65 BPM | WEIGHT: 218.2 LBS | HEIGHT: 72 IN | SYSTOLIC BLOOD PRESSURE: 119 MMHG

## 2022-02-15 DIAGNOSIS — Z23 NEED FOR TDAP VACCINATION: ICD-10-CM

## 2022-02-15 DIAGNOSIS — J30.2 SEASONAL ALLERGIES: Primary | ICD-10-CM

## 2022-02-15 DIAGNOSIS — K21.9 GASTROESOPHAGEAL REFLUX DISEASE, UNSPECIFIED WHETHER ESOPHAGITIS PRESENT: ICD-10-CM

## 2022-02-15 DIAGNOSIS — Z12.5 SCREENING FOR PROSTATE CANCER: ICD-10-CM

## 2022-02-15 DIAGNOSIS — Z13.29 SCREENING FOR THYROID DISORDER: ICD-10-CM

## 2022-02-15 DIAGNOSIS — Z13.220 SCREENING FOR LIPID DISORDERS: ICD-10-CM

## 2022-02-15 DIAGNOSIS — Z01.89 ROUTINE LAB DRAW: ICD-10-CM

## 2022-02-15 DIAGNOSIS — Z11.59 ENCOUNTER FOR HEPATITIS C SCREENING TEST FOR LOW RISK PATIENT: ICD-10-CM

## 2022-02-15 PROCEDURE — 90715 TDAP VACCINE 7 YRS/> IM: CPT | Performed by: NURSE PRACTITIONER

## 2022-02-15 PROCEDURE — 99214 OFFICE O/P EST MOD 30 MIN: CPT | Performed by: NURSE PRACTITIONER

## 2022-02-15 PROCEDURE — 90471 IMMUNIZATION ADMIN: CPT | Performed by: NURSE PRACTITIONER

## 2022-02-15 RX ORDER — CETIRIZINE HYDROCHLORIDE 10 MG/1
10 TABLET ORAL DAILY
Qty: 90 TABLET | Refills: 1 | OUTPATIENT
Start: 2022-02-15 | End: 2022-06-09

## 2022-02-15 RX ORDER — FAMOTIDINE 20 MG/1
20 TABLET, FILM COATED ORAL DAILY PRN
Qty: 30 TABLET | Refills: 0 | OUTPATIENT
Start: 2022-02-15 | End: 2022-06-09

## 2022-02-15 RX ORDER — CETIRIZINE HYDROCHLORIDE 10 MG/1
10 TABLET ORAL DAILY
COMMUNITY
End: 2022-02-15 | Stop reason: SDUPTHER

## 2022-02-15 NOTE — PROGRESS NOTES
"19.19Chief Complaint  No chief complaint on file.    Subjective          Kiran Hensley Sr presents to [facility]  History of Present Illness  Pt states that he doing good since having COVID-19. Stateh he was dx on 2/1/22. He had sore throat, h/a, sinus drainage, cough,  loss of taste, skin sensitivity, chills. Denies fever, body aches, loss of smell, diarrhea, N/V. Reports 75% of his taste has returned. He is feeling good.       States he is having trouble with his allergies-prescribed zyrtec 10mg qd. He is drinking 2 quarts of water daily. He has tried mucinex w/o relief.     Reports he has not had anymore issues with his bp-it is staying  120/80.     Admits occ hb/reflux-states he raised his bed and it has helped. States it usually occurs if he eats late. He takes tums prn. Prescribed pepcid.     Due tdap-administered today    Last colonoscopy 2019-WNL      He  has a past medical history of Acid reflux, Arthritis, and Hypertension.     Objective   Vital Signs:   /78 (BP Location: Right arm, Patient Position: Sitting, Cuff Size: Adult)   Pulse 65   Ht 182.9 cm (72\")   Wt 99 kg (218 lb 3.2 oz)   SpO2 99%   BMI 29.59 kg/m²     Physical Exam  Constitutional:       Appearance: Normal appearance.   Neck:      Thyroid: No thyroid mass, thyromegaly or thyroid tenderness.      Vascular: No carotid bruit.   Cardiovascular:      Rate and Rhythm: Normal rate and regular rhythm.      Pulses: Normal pulses.      Heart sounds: Normal heart sounds.   Pulmonary:      Effort: Pulmonary effort is normal.      Breath sounds: Normal breath sounds.   Musculoskeletal:      Right lower leg: No edema.      Left lower leg: No edema.   Skin:     General: Skin is warm and dry.   Neurological:      General: No focal deficit present.      Mental Status: He is alert and oriented to person, place, and time.   Psychiatric:         Mood and Affect: Mood normal.         Behavior: Behavior normal. "        Result Review :{Labs  Result Review  Imaging  Med Tab  Media  Procedures :23}   The following data was reviewed by: OSIEL Rios on 02/15/2022:  CMP    CMP 7/20/21 9/1/21   Glucose 81 87   BUN 12 10   Creatinine 1.28 (A) 0.99   eGFR Non African Am 58 (A) 78   Sodium 138 139   Potassium 4.5 4.4   Chloride 103 102   Calcium 9.0 9.8   Albumin 4.40    Total Bilirubin 0.6    Alkaline Phosphatase 78    AST (SGOT) 17    ALT (SGPT) 12    (A) Abnormal value            CBC    CBC 7/20/21   WBC 4.92   RBC 4.89   Hemoglobin 13.3   Hematocrit 40.1   MCV 82.0   MCH 27.2   MCHC 33.2   RDW 13.9   Platelets 265                                Past Surgical History:   Procedure Laterality Date   • ANKLE SURGERY     • COLONOSCOPY  2019   • ENDOSCOPY  2011    Dr. Monteiro   • LASER ABLATION     • OTHER SURGICAL HISTORY      metal implants   • TOTAL KNEE ARTHROPLASTY        Family History   Problem Relation Age of Onset   • Arthritis Mother    • Arthritis Father    • Cancer Paternal Grandmother    • Cancer Paternal Grandfather    • Stroke Other         Uncle        Current Outpatient Medications:   •  cetirizine (zyrTEC) 10 MG tablet, Take 1 tablet by mouth Daily., Disp: 90 tablet, Rfl: 1  •  famotidine (Pepcid) 20 MG tablet, Take 1 tablet by mouth Daily As Needed for Heartburn., Disp: 30 tablet, Rfl: 0   Assessment and Plan    Diagnoses and all orders for this visit:    1. Seasonal allergies (Primary)  Comments:  uncontrolled seasonal allergies-prescribed zyrtec 10mg qd.   Orders:  -     cetirizine (zyrTEC) 10 MG tablet; Take 1 tablet by mouth Daily.  Dispense: 90 tablet; Refill: 1    2. Screening for lipid disorders  -     Lipid Panel; Future    3. Screening for thyroid disorder  -     TSH; Future    4. Routine lab draw  -     CBC & Differential; Future  -     Comprehensive Metabolic Panel; Future  -     Urinalysis With Microscopic If Indicated (No Culture) - Urine, Clean Catch; Future    5. Gastroesophageal  reflux disease, unspecified whether esophagitis present  Comments:  reports prn use or tums and elevating his HOB due to the reflux-prescribed pepcid 20mg qd.   Orders:  -     famotidine (Pepcid) 20 MG tablet; Take 1 tablet by mouth Daily As Needed for Heartburn.  Dispense: 30 tablet; Refill: 0    6. Need for Tdap vaccination  -     Tdap Vaccine Greater Than or Equal To 8yo IM    7. Encounter for hepatitis C screening test for low risk patient  -     Hepatitis C antibody; Future    8. Screening for prostate cancer  -     PSA SCREENING; Future        Follow Up   Return in about 6 months (around 8/15/2022).  Patient was given instructions and counseling regarding his condition or for health maintenance advice. Please see specific information pulled into the AVS if appropriate.     Kiran Hensley Sr  reports that he has never smoked. He has never used smokeless tobacco..            Maggie Chavez, OSIEL    The patient is advised to continue current medications.

## 2022-03-08 ENCOUNTER — LAB (OUTPATIENT)
Dept: LAB | Facility: HOSPITAL | Age: 57
End: 2022-03-08

## 2022-03-08 DIAGNOSIS — Z12.5 SCREENING FOR PROSTATE CANCER: ICD-10-CM

## 2022-03-08 DIAGNOSIS — Z01.89 ROUTINE LAB DRAW: ICD-10-CM

## 2022-03-08 DIAGNOSIS — Z13.220 SCREENING FOR LIPID DISORDERS: ICD-10-CM

## 2022-03-08 DIAGNOSIS — Z11.59 ENCOUNTER FOR HEPATITIS C SCREENING TEST FOR LOW RISK PATIENT: ICD-10-CM

## 2022-03-08 DIAGNOSIS — Z13.29 SCREENING FOR THYROID DISORDER: ICD-10-CM

## 2022-03-08 LAB
ALBUMIN SERPL-MCNC: 4.3 G/DL (ref 3.5–5.2)
ALBUMIN/GLOB SERPL: 1.5 G/DL
ALP SERPL-CCNC: 81 U/L (ref 39–117)
ALT SERPL W P-5'-P-CCNC: 25 U/L (ref 1–41)
ANION GAP SERPL CALCULATED.3IONS-SCNC: 9.5 MMOL/L (ref 5–15)
AST SERPL-CCNC: 26 U/L (ref 1–40)
BASOPHILS # BLD AUTO: 0.08 10*3/MM3 (ref 0–0.2)
BASOPHILS NFR BLD AUTO: 1.6 % (ref 0–1.5)
BILIRUB SERPL-MCNC: 0.4 MG/DL (ref 0–1.2)
BILIRUB UR QL STRIP: NEGATIVE
BUN SERPL-MCNC: 13 MG/DL (ref 6–20)
BUN/CREAT SERPL: 14.3 (ref 7–25)
CALCIUM SPEC-SCNC: 9.2 MG/DL (ref 8.6–10.5)
CHLORIDE SERPL-SCNC: 108 MMOL/L (ref 98–107)
CHOLEST SERPL-MCNC: 198 MG/DL (ref 0–200)
CLARITY UR: CLEAR
CO2 SERPL-SCNC: 23.5 MMOL/L (ref 22–29)
COLOR UR: YELLOW
CREAT SERPL-MCNC: 0.91 MG/DL (ref 0.76–1.27)
DEPRECATED RDW RBC AUTO: 43.2 FL (ref 37–54)
EGFRCR SERPLBLD CKD-EPI 2021: 97.7 ML/MIN/1.73
EOSINOPHIL # BLD AUTO: 0.32 10*3/MM3 (ref 0–0.4)
EOSINOPHIL NFR BLD AUTO: 6.2 % (ref 0.3–6.2)
ERYTHROCYTE [DISTWIDTH] IN BLOOD BY AUTOMATED COUNT: 14 % (ref 12.3–15.4)
GLOBULIN UR ELPH-MCNC: 2.8 GM/DL
GLUCOSE SERPL-MCNC: 105 MG/DL (ref 65–99)
GLUCOSE UR STRIP-MCNC: NEGATIVE MG/DL
HCT VFR BLD AUTO: 42.4 % (ref 37.5–51)
HCV AB SER DONR QL: NORMAL
HDLC SERPL-MCNC: 54 MG/DL (ref 40–60)
HGB BLD-MCNC: 13.5 G/DL (ref 13–17.7)
HGB UR QL STRIP.AUTO: NEGATIVE
IMM GRANULOCYTES # BLD AUTO: 0.01 10*3/MM3 (ref 0–0.05)
IMM GRANULOCYTES NFR BLD AUTO: 0.2 % (ref 0–0.5)
KETONES UR QL STRIP: NEGATIVE
LDLC SERPL CALC-MCNC: 134 MG/DL (ref 0–100)
LDLC/HDLC SERPL: 2.46 {RATIO}
LEUKOCYTE ESTERASE UR QL STRIP.AUTO: NEGATIVE
LYMPHOCYTES # BLD AUTO: 1.24 10*3/MM3 (ref 0.7–3.1)
LYMPHOCYTES NFR BLD AUTO: 24.2 % (ref 19.6–45.3)
MCH RBC QN AUTO: 26.8 PG (ref 26.6–33)
MCHC RBC AUTO-ENTMCNC: 31.8 G/DL (ref 31.5–35.7)
MCV RBC AUTO: 84.1 FL (ref 79–97)
MONOCYTES # BLD AUTO: 0.59 10*3/MM3 (ref 0.1–0.9)
MONOCYTES NFR BLD AUTO: 11.5 % (ref 5–12)
NEUTROPHILS NFR BLD AUTO: 2.89 10*3/MM3 (ref 1.7–7)
NEUTROPHILS NFR BLD AUTO: 56.3 % (ref 42.7–76)
NITRITE UR QL STRIP: NEGATIVE
NRBC BLD AUTO-RTO: 0 /100 WBC (ref 0–0.2)
PH UR STRIP.AUTO: 6.5 [PH] (ref 5–8)
PLATELET # BLD AUTO: 231 10*3/MM3 (ref 140–450)
PMV BLD AUTO: 11.6 FL (ref 6–12)
POTASSIUM SERPL-SCNC: 4.6 MMOL/L (ref 3.5–5.2)
PROT SERPL-MCNC: 7.1 G/DL (ref 6–8.5)
PROT UR QL STRIP: NEGATIVE
PSA SERPL-MCNC: 0.16 NG/ML (ref 0–4)
RBC # BLD AUTO: 5.04 10*6/MM3 (ref 4.14–5.8)
SODIUM SERPL-SCNC: 141 MMOL/L (ref 136–145)
SP GR UR STRIP: 1.02 (ref 1–1.03)
TRIGL SERPL-MCNC: 55 MG/DL (ref 0–150)
TSH SERPL DL<=0.05 MIU/L-ACNC: 1.25 UIU/ML (ref 0.27–4.2)
UROBILINOGEN UR QL STRIP: NORMAL
VLDLC SERPL-MCNC: 10 MG/DL (ref 5–40)
WBC NRBC COR # BLD: 5.13 10*3/MM3 (ref 3.4–10.8)

## 2022-03-08 PROCEDURE — 80061 LIPID PANEL: CPT

## 2022-03-08 PROCEDURE — G0103 PSA SCREENING: HCPCS

## 2022-03-08 PROCEDURE — 36415 COLL VENOUS BLD VENIPUNCTURE: CPT

## 2022-03-08 PROCEDURE — 81003 URINALYSIS AUTO W/O SCOPE: CPT

## 2022-03-08 PROCEDURE — 86803 HEPATITIS C AB TEST: CPT

## 2022-03-08 PROCEDURE — 80050 GENERAL HEALTH PANEL: CPT

## 2022-03-09 ENCOUNTER — TELEPHONE (OUTPATIENT)
Dept: FAMILY MEDICINE CLINIC | Facility: CLINIC | Age: 57
End: 2022-03-09

## 2022-03-09 DIAGNOSIS — R73.09 ELEVATED GLUCOSE: Primary | ICD-10-CM

## 2022-03-09 NOTE — TELEPHONE ENCOUNTER
----- Message from OSIEL Rios sent at 3/8/2022  8:46 PM EST -----  UA, TSH, PSA, Hep C antibody, CBC Normal. Lipid normal w/exception of sl elevated ldl-recommend low chol diet. Elevated glucose add a1c

## 2022-03-29 ENCOUNTER — TELEPHONE (OUTPATIENT)
Dept: FAMILY MEDICINE CLINIC | Facility: CLINIC | Age: 57
End: 2022-03-29

## 2022-04-12 NOTE — PROGRESS NOTES
"Chief Complaint  Follow-up (6 months) and Hypertension    Subjective          Kiran BLUE Hensley Sr presents to [facility]     Patient says he has no cc. States he run out of Lisinopril 7-8 weeks ago and haven't been taking it. Says he do not check BP at home today in office it is 123/75. Denies feeling flushed or h/a.  Instructed to monitor his b/p at home.     He had a CMP done yesterday and his kidney function is back to normal. States he always drinks plenty of water -he does not drink cokes-the only difference was stopping the lisinopril.    Discussed importance of COVID vaccines-he is going to consider getting it.       He  has a past medical history of Acid reflux, Arthritis, and Hypertension.     Objective   Vital Signs:   /75 (BP Location: Right arm, Patient Position: Sitting, Cuff Size: Adult)   Pulse 71   Ht 182.9 cm (72\")   Wt 96.6 kg (213 lb)   SpO2 100%   BMI 28.89 kg/m²     Physical Exam  Constitutional:       Appearance: Normal appearance.   Cardiovascular:      Rate and Rhythm: Normal rate and regular rhythm.      Pulses: Normal pulses.      Heart sounds: Normal heart sounds.   Pulmonary:      Effort: Pulmonary effort is normal.      Breath sounds: Normal breath sounds.   Musculoskeletal:      Right lower leg: No edema.      Left lower leg: No edema.   Neurological:      General: No focal deficit present.      Mental Status: He is alert and oriented to person, place, and time.   Psychiatric:         Mood and Affect: Mood normal.         Behavior: Behavior normal.        Result Review :     CMP    CMP 12/28/20 7/20/21 9/1/21   Glucose  81 87   Glucose 98     BUN 19 12 10   Creatinine 0.89 1.28 (A) 0.99   eGFR Non African Am  58 (A) 78   Sodium 140 138 139   Potassium 4.1 4.5 4.4   Chloride 105 103 102   Calcium 9.3 9.0 9.8   Albumin 4.4 4.40    Total Bilirubin 0.63 0.6    Alkaline Phosphatase 85 78    AST (SGOT) 34 17    ALT (SGPT) 36 12    (A) Abnormal " [facility] e[facility]GENCY dEPARTMENT encounter:    [facility] EMERGENCY DEPT  [address] [facility] [address]  [phone]    CHIEF COMPLAINT:    Chief Complaint   Patient presents with   • Fall   Pt came in by EMS after an unwitnessed fall. No blood thinners. Injury to the forehead.    HPI:    Kareen Pack is a 84 year old female who presented to the ED via EMS from her memory care long term care residence for evaluation following an unwitnessed fall.  She has a hematoma developing on her R forehead.  She is unable to provide history due to dementia and due to unwillingness to cooperate with interview.  Information was obtained from EMS personnel, facility staff, discussed with her [facility] and review of records.      ALLERGIES:    ALLERGIES:  No Known Allergies    CURRENT MEDICATIONS:    No current facility-administered medications for this encounter.     Current Outpatient Medications   Medication Sig Dispense Refill   • lisinopril (ZESTRIL) 20 MG tablet Take 1 tablet by mouth daily. Do not start before March 2, 2022.     • Lactobacillus Tab Take 1 tablet by mouth daily. 20 tablet 0   • QUEtiapine (SEROquel) 25 MG tablet Take 0.5 tablets by mouth daily as needed (agitation). 4 tablet 0   • acetaminophen (TYLENOL) 325 MG tablet Take 650 mg by mouth every 6 hours as needed for Pain or Fever.      • allopurinol (ZYLOPRIM) 100 MG tablet Take 100 mg by mouth daily.      • amLODIPine (NORVASC) 5 MG tablet Take 5 mg by mouth daily.      • calcitRIOL (ROCALTROL) 0.25 MCG capsule Take 0.25 mcg by mouth 3 days a week. (Monday, Wednesday, Friday)     • levothyroxine 50 MCG tablet Take 50 mcg by mouth daily.      • loperamide (IMODIUM) 2 MG capsule Take 2 mg by mouth every 4 hours as needed for Diarrhea.      • metoPROLOL succinate (TOPROL-XL) 25 MG 24 hr tablet Take 25 mg by mouth daily.      • pravastatin (PRAVACHOL) 80 MG tablet Take 80 mg by mouth daily.      • QUEtiapine  (SEROquel) 25 MG tablet Take 37.5 mg by mouth daily.      • sertraline (ZOLOFT) 100 MG tablet Take 100 mg by mouth daily.      • Nutritional Supplements (ENSURE ORIGINAL PO) Take 1 Can by mouth 3 times daily (with meals).     • magnesium hydroxide (MILK OF MAGNESIA) 400 MG/5ML suspension Take 30 mLs by mouth daily as needed for Constipation.         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    SOCIAL HISTORY:    Social History     Tobacco Use   • Smoking status: Never Smoker   • Smokeless tobacco: Never Used   Substance Use Topics   • Alcohol use: Not Currently   • Drug use: Not Currently       FAMILY HISTORY:    No family history on file.    REVIEW OF SYSTEMS:    Unable to interview the patient and collect a ROS (review of systems) secondary to the patient's unwillingness to cooperate with the interview and secondary to the patient's baseline cognitive impairment.    PHYSICAL EXAM:   Vitals:    04/11/22 2007 04/11/22 2008 04/11/22 2015   BP:  93/60 94/60   Patient Position:  Supine    Pulse:  79 82   Resp:  18    Temp:  98.1 °F (36.7 °C)    TempSrc:  Axillary    SpO2: 96% 99% 99%        General: Alert, surly, uncooperative, conversive, thin elderly woman in moderate distress.  .   Skin:  Warm and dry without rash.  Ecchymosis on right forehead.  HEENT: Soft tissue swelling c/w scalp hematoma on R forehead.  Normal conjunctivae and sclerae.  PERRL.  EOMI.   value                     Assessment and Plan    Diagnoses and all orders for this visit:    1. Essential hypertension (Primary)  Comments:  htn resolved -no longer taking lisinopril 10mg -b/p excellent in the office today-instructed to monitor at home and call if >140/90.    2. Renal insufficiency  Comments:  rechecked CMP-his kidney function is back to normal.         Follow Up   Return in about 6 months (around 3/2/2022).  Patient was given instructions and counseling regarding his condition or for health maintenance advice. Please see specific information pulled into the AVS if appropriate.     Kiran Hensley Sr  reports that he has never smoked. He has never used smokeless tobacco..              Mucous membranes are moist.    Neck: Trachea is midline.   Cardiovascular: Symmetrical pulses.  Respiratory:  Normal respiratory effort.  Clear to auscultation.    Gastrointestinal:  Soft, non-tender.  Normal bowel sounds.    Musculoskeletal:  No new deformities, edema.  C/o pain with passive ROM of right hip.  Back: Normal alignment.    Neurologic: Oriented x 1-2   No new focal deficits or lateralizing signs.  Psychiatric:  Uncooperative.  Shouting at and trying to hit nursing staff.        RADIOLOGY AND LAB RESULTS:    Results for orders placed or performed during the hospital encounter of 04/11/22   Prothrombin Time   Result Value    Prothrombin Time 10.9    INR 1.0     Comment: INR Therapeutic Range: 2.0 to 3.0 (2.5 to 3.5 recommended for recurrent thrombotic episodes and mechanical prosthetic heart valves.)   Partial Thromboplastin Time   Result Value    PTT 23   Comprehensive Metabolic Panel   Result Value    Fasting Status     Sodium 142    Potassium 4.4    Chloride 104    Carbon Dioxide 31    Anion Gap 11    Glucose 89    BUN 35 (H)    Creatinine 1.99 (H)    Glomerular Filtration Rate 22 (L)     Comment: eGFR 15 - 29 mL/min/1.73 m2 = Severe decrease in kidney function. Stage 4 CKD (chronic kidney disease), or severe kidney disease. Estimated GFR calculated using the 2009 CKD-EPI creatinine equation.    BUN/ Creatinine Ratio 18    Calcium 9.0    Bilirubin, Total 0.3    GOT/AST 18    GPT/ALT 18    Alkaline Phosphatase 84    Albumin 3.2 (L)    Protein, Total 6.6    Globulin 3.4    A/G Ratio 0.9 (L)   Procalcitonin   Result Value    Procalcitonin 0.13 (H)     Comment:   Bacterial Sepsis:  <0.5 ng/mL:    Sepsis not likely. Localized bacterial infection possible.  0.5 - 2 ng/mL: Sepsis or other conditions possible  >2.0 ng/mL:    High risk of sepsis/septic shock    NOTE: If bacterial sepsis is of high clinical suspicion but PCT<2 ng/mL,  consider recheck PCT 6-24 hours after initial test.     Lower Respiratory  Tract Infection (LRTI):  <0.1 ng/mL:       Bacterial infection highly unlikely  0.1 - 0.25 ng/mL: Bacterial infection unlikely  0.26 - 0.5 ng/mL: Bacterial infection likely  > 0.5 ng/mL:      Bacterial infection highly likely    NOTE: Patients with advanced CKD or medical/surgical trauma may have  elevated baseline PCT (>0.5 ng/mL) in the absence of bacterial infection. If  bacterial infection is suspected, consider repeat PCT in 2 to 4 days to guide de-escalation or discontinuation of antibiotic therapy.  Higher reference range cutoffs may be appropriate for patients <3 days old.     Magnesium   Result Value    Magnesium 1.8   Lactic Acid Venous With Reflex   Result Value    Lactate, Venous 0.7   TROPONIN I, HIGH SENSITIVITY   Result Value    Troponin I, High Sensitivity 28   NT proBNP   Result Value    NT-proBNP 1,786 (H)   CBC with Automated Differential (performable only)   Result Value    WBC 8.3    RBC 3.42 (L)    HGB 10.3 (L)    HCT 32.4 (L)    MCV 94.7    MCH 30.1    MCHC 31.8 (L)    RDW-CV 14.6    RDW-SD 50.4 (H)        NRBC 0    Neutrophil, Percent 76    Lymphocytes, Percent 12    Mono, Percent 8    Eosinophils, Percent 2    Basophils, Percent 1    Immature Granulocytes 1    Absolute Neutrophils 6.4    Absolute Lymphocytes 1.0    Absolute Monocytes 0.6    Absolute Eosinophils  0.2    Absolute Basophils 0.0    Absolute Immmature Granulocytes 0.1   Gold Top   Result Value    Extra Tube Hold for Add Ons   Basic Metabolic Panel   Result Value    Fasting Status     Sodium 137    Potassium 4.2    Chloride 104    Carbon Dioxide 27    Anion Gap 10    Glucose 92    BUN 32 (H)    Creatinine 1.72 (H)    Glomerular Filtration Rate 27 (L)     Comment: eGFR 15 - 29 mL/min/1.73 m2 = Severe decrease in kidney function. Stage 4 CKD (chronic kidney disease), or severe kidney disease. Estimated GFR calculated using the 2009 CKD-EPI creatinine equation.    BUN/ Creatinine Ratio 19    Calcium 9.0   CBC No Differential    Result Value    WBC 5.6    RBC 3.26 (L)    HGB 9.7 (L)    HCT 31.4 (L)    MCV 96.3    MCH 29.8    MCHC 30.9 (L)     (L)    RDW-CV 14.6    RDW-SD 51.0 (H)    NRBC 0   TYPE/SCREEN   Result Value    ABO/RH(D) O Rh Positive    ANTIBODY SCREEN Negative    TYPE AND SCREEN EXPIRATION DATE 04/14/2022 23:59   Rapid SARS-CoV-2 by PCR    Specimen: Nasal, Mid-turbinate; Swab   Result Value    Rapid SARS-COV-2 by PCR Not Detected    Isolation Guidelines      Comment: Do not use this test result as the sole decision-maker for discontinuation of isolation.   Clinical evaluation should be considered for other respiratory illness requiring transmission-based isolation.    -    No fever (<99.0 F/37.2 C) for at least 24 hours without the use of fever-reducing medications    AND  -    Respiratory symptoms have improved or resolved (e.g. cough, shortness of breath)     AND  -    COVID-19 negative test    See COVID-19 Deisolation Resource Guide    Procedural Comment      Comment: SARS-CoV-2 nucleic acid has not been detected indicating the absence of COVID-19.       Testing was performed using the [employer] Xpert Xpress SARS-CoV-2 RT-PCR assay that has been given Emergency Use Authorization (EUA) by the [place] Food and Drug Administration (FDA).  These results are considered definitive and do not need to be confirmed by another method.     CT LUMBAR SPINE WO CONTRAST   Final Result   IMPRESSION:      1.  There is diffuse osseous demineralization, which limits evaluation for   subtle fractures.   2.  Comminuted, mildly displaced fractures of the right and left greater   trochanters.   3.  No acute osseous findings identified within the lumbar spine.   4.  No acute pelvic fracture identified.   5.  Subtle sclerotic focus within the left sacral mehreen is nonspecific   although could represent a insufficiency fracture. If indicated, dedicated   MRI could be obtained for further evaluation.   6.  Indeterminate, large,  fat-containing lesion within the abdomen, which   is partially visualized on this exam. This may represent a lipoma.   Liposarcoma is not excluded. Follow-up with nonemergent CT or MRI of the   abdomen is recommended for further evaluation.   7.  Additional findings, as described            CT Pelvis   Final Result   IMPRESSION:      1.  There is diffuse osseous demineralization, which limits evaluation for   subtle fractures.   2.  Comminuted, mildly displaced fractures of the right and left greater   trochanters.   3.  No acute osseous findings identified within the lumbar spine.   4.  No acute pelvic fracture identified.   5.  Subtle sclerotic focus within the left sacral mehreen is nonspecific   although could represent a insufficiency fracture. If indicated, dedicated   MRI could be obtained for further evaluation.   6.  Indeterminate, large, fat-containing lesion within the abdomen, which   is partially visualized on this exam. This may represent a lipoma.   Liposarcoma is not excluded. Follow-up with nonemergent CT or MRI of the   abdomen is recommended for further evaluation.   7.  Additional findings, as described            XR Chest AP or PA   Final Result   IMPRESSION:        No acute cardiopulmonary findings.            XR Hip 2-3 View RIGHT and Pelvis   Final Result   FINDINGS/IMPRESSION:       Minimally displaced comminuted fracture of the right greater trochanter. No   hip dislocation. Moderate degenerative changes in the hips bilaterally.   Lower lumbar spine posterior fusion hardware. Degenerative changes of the   SI joints.      CT HEAD WO CONTRAST   Final Result   IMPRESSION:       No intracranial hemorrhage or midline shift.      Moderate cerebral atrophy.      Moderate chronic small vessel ischemic disease.      Moderate scalp hematoma involving the anterolateral right scalp. There is a   smaller area of soft tissue swelling involving the posterior lateral left   scalp.                          ED  COURSE & MEDICAL DECISION MAKING:   Morphine 2mg IM and Ativan 1mg IM administered to facilitate care and ensure staff safety.  Patient has an avulsion fracture of the greater trochanter on the R on plain film, and c/o pain during passive ROM of this hip. CT shows comminuted, mildly displaced fractures of the right and the left greater trochanters.  In discussion with patient's son (=BECCA), he thinks that she had a closed greater trochanter fracture in November 2021 after a fall, but it was in some other hospital system (in [place]? She doesn't have any records match in [facility] in any [place] system).  He didn't remember which side the fracture was on.  Per son and staff at her residential facility, patient is supposed to use a walker all the time (she does not), but does hold on to the railings when she is walking without her walker.  Will likely need PT/OT, pain medication, and discussion with her current residential facility re: whether her needs can continue to be met.  .Case discussed with hospitalist re: admission.    DIAGNOSIS/PLAN:  Diagnosis:  1. Closed displaced fracture of greater trochanter of right femur, initial encounter (CMS/[facility])    2. Closed displaced fracture of greater trochanter of left femur, initial encounter (CMS/[facility])    3. Falls frequently        Plan:   The patient will be admitted to a Medical Bed by Hospitalist Service.                  Chelsie Brown MD  04/14/22 [id number]

## 2022-08-16 ENCOUNTER — HOSPITAL ENCOUNTER (OUTPATIENT)
Dept: GENERAL RADIOLOGY | Facility: HOSPITAL | Age: 57
Discharge: HOME | End: 2022-08-16

## 2022-08-16 ENCOUNTER — OFFICE VISIT (OUTPATIENT)
Dept: FAMILY MEDICINE CLINIC | Facility: CLINIC | Age: 57
End: 2022-08-16

## 2022-08-16 VITALS
BODY MASS INDEX: 28.88 KG/M2 | HEART RATE: 62 BPM | HEIGHT: 72 IN | OXYGEN SATURATION: 100 % | WEIGHT: 213.2 LBS | SYSTOLIC BLOOD PRESSURE: 135 MMHG | DIASTOLIC BLOOD PRESSURE: 70 MMHG

## 2022-08-16 DIAGNOSIS — M19.90 ARTHRITIS: ICD-10-CM

## 2022-08-16 DIAGNOSIS — M54.50 ACUTE BILATERAL LOW BACK PAIN WITHOUT SCIATICA: ICD-10-CM

## 2022-08-16 DIAGNOSIS — M25.572 ACUTE LEFT ANKLE PAIN: ICD-10-CM

## 2022-08-16 DIAGNOSIS — M54.50 ACUTE BILATERAL LOW BACK PAIN WITHOUT SCIATICA: Primary | ICD-10-CM

## 2022-08-16 PROCEDURE — 96372 THER/PROPH/DIAG INJ SC/IM: CPT | Performed by: NURSE PRACTITIONER

## 2022-08-16 PROCEDURE — 73610 X-RAY EXAM OF ANKLE: CPT

## 2022-08-16 PROCEDURE — 72110 X-RAY EXAM L-2 SPINE 4/>VWS: CPT

## 2022-08-16 PROCEDURE — 99213 OFFICE O/P EST LOW 20 MIN: CPT | Performed by: NURSE PRACTITIONER

## 2022-08-16 RX ORDER — TRIAMCINOLONE ACETONIDE 40 MG/ML
60 INJECTION, SUSPENSION INTRA-ARTICULAR; INTRAMUSCULAR ONCE
Status: COMPLETED | OUTPATIENT
Start: 2022-08-16 | End: 2022-08-16

## 2022-08-16 RX ADMIN — TRIAMCINOLONE ACETONIDE 60 MG: 40 INJECTION, SUSPENSION INTRA-ARTICULAR; INTRAMUSCULAR at 11:42

## 2022-08-16 NOTE — PROGRESS NOTES
"Chief Complaint  Back Pain (Pt states that last Thursday he started having Lower back pain, left side after bending over doing work and the pain ( would  take his breath away) progressive throughout the weekend. Pt used OTC IBU and Biofreeze which seemed to help which relieved some tension. /), Hand Pain (Pt states that about 2 months ago he had knuckle pain on the right hand. Pt had xray done and told he has arthritis in hand but still having pain.), Ankle Pain (Arthritis/), and Arthritis    SUBJECTIVE  Kiran Hensley Sr presents to [facility]     History of Present Illness   Kiran Hensley is a 55-year-old male who presents today for a 6-month follow-up and having some knuckle pain.    Right hand knuckle pain - He had an x-ray done at urgent care which showed arthritis. The patient was prescribed naproxen for four days, which did not provide any relief. He notes it is his middle knuckle which is swelling. The patient was informed it was normal \"wear and tear\". He adds his parents have normal arthritis. The patient has previously taken over the counter medication but not recently. He has tried Mobic and Celebrex previously but it was a while ago and he is unable to recall if the medication helped.     Low back pain - He has been experiencing left lower back pain since 08/11/2022. The patient has been using ibuprofen and Biofreeze with some relief. He states his pain is worse in the morning and eases up in the evening. The patient states his wife tried Biofreeze the evening of 08/15/2022 but he was not having pain when applied. He denies having any imaging done of his back. The patient notes he has not previously had any back problems so he is unsure if it is coming from the work he has done before. He denies any numbness or tingling down his legs. The patient adds the pain is the lower top of the hip on the left side kind of where his belt sits. He adds when he moves his right " "leg it hurts on the left.    Left ankle pain - The patient reports he was diagnosed with degenerative disc disease years ago. He states he has intermittent shooting pains in his left ankle. The patient adds his ankle was previously \"locking up\" on him and years ago he had an arthroscopy on it and some loose cartilage pieces were removed. He states it has been bothering him off and on over the last 4 to 5 years. The patient notes it will hurt for a little while and then it will not have any problem with it for weeks.    Past Medical History:   Diagnosis Date   • Acid reflux    • Arthritis    • Hypertension       Family History   Problem Relation Age of Onset   • Arthritis Mother    • Arthritis Father    • Cancer Paternal Grandmother    • Cancer Paternal Grandfather    • Stroke Other         Uncle      Past Surgical History:   Procedure Laterality Date   • ANKLE SURGERY     • COLONOSCOPY  2019   • ENDOSCOPY  2011    Dr. Monteiro   • LASER ABLATION     • OTHER SURGICAL HISTORY      metal implants   • TOTAL KNEE ARTHROPLASTY          Current Outpatient Medications:   •  diclofenac (VOLTAREN) 50 MG EC tablet, Take 1 tablet by mouth 2 (Two) Times a Day., Disp: 60 tablet, Rfl: 0  No current facility-administered medications for this visit.    OBJECTIVE  Vital Signs:   /70 (BP Location: Right arm, Patient Position: Sitting, Cuff Size: Adult)   Pulse 62   Ht 182.9 cm (72\")   Wt 96.7 kg (213 lb 3.2 oz)   SpO2 100%   BMI 28.92 kg/m²    Estimated body mass index is 28.92 kg/m² as calculated from the following:    Height as of this encounter: 182.9 cm (72\").    Weight as of this encounter: 96.7 kg (213 lb 3.2 oz).     Wt Readings from Last 3 Encounters:   08/16/22 96.7 kg (213 lb 3.2 oz)   06/09/22 95.3 kg (210 lb)   02/15/22 99 kg (218 lb 3.2 oz)     BP Readings from Last 3 Encounters:   08/16/22 135/70   06/09/22 128/85   02/15/22 119/78       Physical Exam  Vitals reviewed.   Constitutional:       Appearance: Normal " appearance. He is well-developed.   HENT:      Head: Normocephalic and atraumatic.      Right Ear: External ear normal.      Left Ear: External ear normal.      Mouth/Throat:      Pharynx: No oropharyngeal exudate.   Eyes:      Conjunctiva/sclera: Conjunctivae normal.      Pupils: Pupils are equal, round, and reactive to light.   Cardiovascular:      Rate and Rhythm: Normal rate and regular rhythm.      Heart sounds: No murmur heard.    No friction rub. No gallop.   Pulmonary:      Effort: Pulmonary effort is normal.      Breath sounds: Normal breath sounds. No wheezing or rhonchi.   Abdominal:      General: Bowel sounds are normal. There is no distension.      Palpations: Abdomen is soft.      Tenderness: There is no abdominal tenderness.   Musculoskeletal:      Lumbar back: Tenderness present. Positive right straight leg raise test. Negative left straight leg raise test.      Comments: Arthritic nodules on fingers bilaterally.   Skin:     General: Skin is warm and dry.   Neurological:      Mental Status: He is alert and oriented to person, place, and time.      Cranial Nerves: No cranial nerve deficit.   Psychiatric:         Mood and Affect: Mood and affect normal.         Behavior: Behavior normal.         Thought Content: Thought content normal.         Judgment: Judgment normal.          Result Review        XR Hand 3+ View Right    Result Date: 6/9/2022    1. Nonspecific mild soft tissue prominence of the 2nd and 3rd digits. 2. Mild-to-moderate arthropathy of the hand and radial wrist, favored to be related to osteoarthritis.  No definitive findings of erosive arthropathy are seen at this time. 3. No definite findings of acute osseous hand abnormality.      MELODIE OJEDA MD       Electronically Signed and Approved By: MELODIE OJEDA MD on 6/09/2022 at 10:57                The above data has been reviewed by OSIEL Rios 08/16/2022 08:16 EDT.          Patient Care Team:  Maggie Chavez,  OSIEL as PCP - General (Nurse Practitioner)        ASSESSMENT & PLAN    Diagnoses and all orders for this visit:    1. Acute bilateral low back pain without sciatica (Primary)  Comments:  We will obtain an x-ray of the lumbar spine. We will administer a Kenalog injection in the office today.   Orders:  -     diclofenac (VOLTAREN) 50 MG EC tablet; Take 1 tablet by mouth 2 (Two) Times a Day.  Dispense: 60 tablet; Refill: 0  -     XR Spine Lumbar 4+ View; Future  -     triamcinolone acetonide (KENALOG-40) injection 60 mg    2. Arthritis  Comments:  He will take diclofenac twice daily.  Orders:  -     diclofenac (VOLTAREN) 50 MG EC tablet; Take 1 tablet by mouth 2 (Two) Times a Day.  Dispense: 60 tablet; Refill: 0    3. Acute left ankle pain  Comments:  We will obtain an x-ray of the left ankle.  Orders:  -     XR Ankle 3+ View Left; Future         Tobacco Use: Low Risk    • Smoking Tobacco Use: Never Smoker   • Smokeless Tobacco Use: Never Used       Follow Up     Return in about 4 weeks (around 9/13/2022).      Patient was given instructions and counseling regarding his condition or for health maintenance advice. Please see specific information pulled into the AVS if appropriate.   I have reviewed information obtained and documented by others and I have confirmed the accuracy of this documented note.    OSIEL Rios      Transcribed from ambient dictation for OSIEL Rios by Santos Bowman.  08/16/22   11:35 EDT    Patient verbalized consent to the visit recording.

## 2022-08-18 DIAGNOSIS — M25.572 ACUTE LEFT ANKLE PAIN: Primary | ICD-10-CM

## 2022-08-18 DIAGNOSIS — M54.50 ACUTE BILATERAL LOW BACK PAIN WITHOUT SCIATICA: ICD-10-CM

## 2022-08-26 ENCOUNTER — HOSPITAL ENCOUNTER (OUTPATIENT)
Dept: MRI IMAGING | Facility: HOSPITAL | Age: 57
Discharge: HOME | End: 2022-08-26
Admitting: NURSE PRACTITIONER

## 2022-08-26 DIAGNOSIS — M54.50 ACUTE BILATERAL LOW BACK PAIN WITHOUT SCIATICA: ICD-10-CM

## 2022-08-26 PROCEDURE — 72148 MRI LUMBAR SPINE W/O DYE: CPT

## 2022-08-29 DIAGNOSIS — M51.36 DDD (DEGENERATIVE DISC DISEASE), LUMBAR: Primary | ICD-10-CM

## 2022-08-29 DIAGNOSIS — R93.89 ABNORMAL MRI: ICD-10-CM

## 2022-08-29 DIAGNOSIS — M51.36 L4-L5 DISC BULGE: ICD-10-CM

## 2023-02-22 ENCOUNTER — OFFICE VISIT (OUTPATIENT)
Dept: INTERNAL MEDICINE | Facility: CLINIC | Age: 58
End: 2023-02-22
Payer: COMMERCIAL

## 2023-02-22 ENCOUNTER — LAB (OUTPATIENT)
Dept: LAB | Facility: HOSPITAL | Age: 58
End: 2023-02-22
Payer: COMMERCIAL

## 2023-02-22 VITALS
BODY MASS INDEX: 28.82 KG/M2 | HEART RATE: 68 BPM | OXYGEN SATURATION: 97 % | TEMPERATURE: 97.3 F | DIASTOLIC BLOOD PRESSURE: 84 MMHG | SYSTOLIC BLOOD PRESSURE: 130 MMHG | HEIGHT: 72 IN | RESPIRATION RATE: 18 BRPM | WEIGHT: 212.8 LBS

## 2023-02-22 DIAGNOSIS — G89.29 CHRONIC HEEL PAIN, RIGHT: ICD-10-CM

## 2023-02-22 DIAGNOSIS — I10 PRIMARY HYPERTENSION: ICD-10-CM

## 2023-02-22 DIAGNOSIS — M79.671 CHRONIC HEEL PAIN, RIGHT: ICD-10-CM

## 2023-02-22 DIAGNOSIS — K21.9 GASTROESOPHAGEAL REFLUX DISEASE WITHOUT ESOPHAGITIS: Primary | ICD-10-CM

## 2023-02-22 DIAGNOSIS — K21.9 GASTROESOPHAGEAL REFLUX DISEASE WITHOUT ESOPHAGITIS: ICD-10-CM

## 2023-02-22 DIAGNOSIS — R19.7 DIARRHEA, UNSPECIFIED TYPE: ICD-10-CM

## 2023-02-22 DIAGNOSIS — R19.7 DIARRHEA, UNSPECIFIED TYPE: Primary | ICD-10-CM

## 2023-02-22 DIAGNOSIS — R13.19 OTHER DYSPHAGIA: ICD-10-CM

## 2023-02-22 LAB
25(OH)D3 SERPL-MCNC: 17.5 NG/ML (ref 30–100)
ALBUMIN SERPL-MCNC: 4.4 G/DL (ref 3.5–5.2)
ALBUMIN/GLOB SERPL: 1.5 G/DL
ALP SERPL-CCNC: 136 U/L (ref 39–117)
ALT SERPL W P-5'-P-CCNC: 23 U/L (ref 1–41)
ANION GAP SERPL CALCULATED.3IONS-SCNC: 6.3 MMOL/L (ref 5–15)
AST SERPL-CCNC: 19 U/L (ref 1–40)
BASOPHILS # BLD AUTO: 0.08 10*3/MM3 (ref 0–0.2)
BASOPHILS NFR BLD AUTO: 1.7 % (ref 0–1.5)
BILIRUB SERPL-MCNC: 0.9 MG/DL (ref 0–1.2)
BUN SERPL-MCNC: 10 MG/DL (ref 6–20)
BUN/CREAT SERPL: 11.1 (ref 7–25)
CALCIUM SPEC-SCNC: 9.4 MG/DL (ref 8.6–10.5)
CHLORIDE SERPL-SCNC: 103 MMOL/L (ref 98–107)
CHOLEST SERPL-MCNC: 229 MG/DL (ref 0–200)
CO2 SERPL-SCNC: 27.7 MMOL/L (ref 22–29)
CREAT SERPL-MCNC: 0.9 MG/DL (ref 0.76–1.27)
DEPRECATED RDW RBC AUTO: 44.9 FL (ref 37–54)
EGFRCR SERPLBLD CKD-EPI 2021: 98.4 ML/MIN/1.73
EOSINOPHIL # BLD AUTO: 0.18 10*3/MM3 (ref 0–0.4)
EOSINOPHIL NFR BLD AUTO: 3.8 % (ref 0.3–6.2)
ERYTHROCYTE [DISTWIDTH] IN BLOOD BY AUTOMATED COUNT: 15.5 % (ref 12.3–15.4)
FOLATE SERPL-MCNC: 7.02 NG/ML (ref 4.78–24.2)
GLOBULIN UR ELPH-MCNC: 3 GM/DL
GLUCOSE SERPL-MCNC: 83 MG/DL (ref 65–99)
HCT VFR BLD AUTO: 41.5 % (ref 37.5–51)
HDLC SERPL-MCNC: 64 MG/DL (ref 40–60)
HGB BLD-MCNC: 13.4 G/DL (ref 13–17.7)
IMM GRANULOCYTES # BLD AUTO: 0.02 10*3/MM3 (ref 0–0.05)
IMM GRANULOCYTES NFR BLD AUTO: 0.4 % (ref 0–0.5)
LDLC SERPL CALC-MCNC: 154 MG/DL (ref 0–100)
LDLC/HDLC SERPL: 2.37 {RATIO}
LYMPHOCYTES # BLD AUTO: 1.24 10*3/MM3 (ref 0.7–3.1)
LYMPHOCYTES NFR BLD AUTO: 25.8 % (ref 19.6–45.3)
MAGNESIUM SERPL-MCNC: 2.3 MG/DL (ref 1.6–2.6)
MCH RBC QN AUTO: 25.8 PG (ref 26.6–33)
MCHC RBC AUTO-ENTMCNC: 32.3 G/DL (ref 31.5–35.7)
MCV RBC AUTO: 80 FL (ref 79–97)
MONOCYTES # BLD AUTO: 0.51 10*3/MM3 (ref 0.1–0.9)
MONOCYTES NFR BLD AUTO: 10.6 % (ref 5–12)
NEUTROPHILS NFR BLD AUTO: 2.77 10*3/MM3 (ref 1.7–7)
NEUTROPHILS NFR BLD AUTO: 57.7 % (ref 42.7–76)
NRBC BLD AUTO-RTO: 0 /100 WBC (ref 0–0.2)
PLATELET # BLD AUTO: 296 10*3/MM3 (ref 140–450)
PMV BLD AUTO: 10.5 FL (ref 6–12)
POTASSIUM SERPL-SCNC: 3.9 MMOL/L (ref 3.5–5.2)
PROT SERPL-MCNC: 7.4 G/DL (ref 6–8.5)
RBC # BLD AUTO: 5.19 10*6/MM3 (ref 4.14–5.8)
SODIUM SERPL-SCNC: 137 MMOL/L (ref 136–145)
T3FREE SERPL-MCNC: 2.9 PG/ML (ref 2–4.4)
T4 FREE SERPL-MCNC: 1.24 NG/DL (ref 0.93–1.7)
TRIGL SERPL-MCNC: 66 MG/DL (ref 0–150)
TSH SERPL DL<=0.05 MIU/L-ACNC: 0.78 UIU/ML (ref 0.27–4.2)
UREA BREATH TEST QL: NEGATIVE
VIT B12 BLD-MCNC: 276 PG/ML (ref 211–946)
VLDLC SERPL-MCNC: 11 MG/DL (ref 5–40)
WBC NRBC COR # BLD: 4.8 10*3/MM3 (ref 3.4–10.8)

## 2023-02-22 PROCEDURE — 99204 OFFICE O/P NEW MOD 45 MIN: CPT | Performed by: INTERNAL MEDICINE

## 2023-02-22 PROCEDURE — 84481 FREE ASSAY (FT-3): CPT

## 2023-02-22 PROCEDURE — 83013 H PYLORI (C-13) BREATH: CPT

## 2023-02-22 PROCEDURE — 80061 LIPID PANEL: CPT

## 2023-02-22 PROCEDURE — 82306 VITAMIN D 25 HYDROXY: CPT

## 2023-02-22 PROCEDURE — 83735 ASSAY OF MAGNESIUM: CPT

## 2023-02-22 PROCEDURE — 84439 ASSAY OF FREE THYROXINE: CPT

## 2023-02-22 PROCEDURE — 82607 VITAMIN B-12: CPT

## 2023-02-22 PROCEDURE — 36415 COLL VENOUS BLD VENIPUNCTURE: CPT

## 2023-02-22 PROCEDURE — 80050 GENERAL HEALTH PANEL: CPT

## 2023-02-22 PROCEDURE — 82746 ASSAY OF FOLIC ACID SERUM: CPT

## 2023-02-22 RX ORDER — DULOXETIN HYDROCHLORIDE 60 MG/1
60 CAPSULE, DELAYED RELEASE ORAL DAILY
COMMUNITY

## 2023-02-22 RX ORDER — TIZANIDINE 4 MG/1
4 TABLET ORAL EVERY 8 HOURS PRN
COMMUNITY

## 2023-02-22 RX ORDER — PREGABALIN 25 MG/1
25 CAPSULE ORAL 2 TIMES DAILY
COMMUNITY

## 2023-02-22 RX ORDER — PANTOPRAZOLE SODIUM 40 MG/1
40 TABLET, DELAYED RELEASE ORAL DAILY
Qty: 30 TABLET | Refills: 2 | Status: SHIPPED | OUTPATIENT
Start: 2023-02-22

## 2023-02-22 NOTE — ASSESSMENT & PLAN NOTE
Not controlled complaints of food getting stuck in the base of his neck like coleslaw, mashed potatoes for the past year occurring about once per 2 weeks.  No weight loss noted.  Last EGD by Dr. Cee showed inflammation and this was 10 years ago.    Plan -schedule appointment with Dr. Cee for reevaluation.  May need repeat EGD.  Asked patient to wallow slowly and avoid hard foods

## 2023-02-22 NOTE — ASSESSMENT & PLAN NOTE
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

## 2023-02-22 NOTE — ASSESSMENT & PLAN NOTE
Diarrhea for 2 to 3 weeks occurring 4-5 times per day.  History of C. difficile colitis years ago.  Denies any recent antibiotic use.  Urinating every 6 hours per patient.    Plan-Will get stool for studies Shigella salmonella Campylobacter and E. coli, will also check for C. difficile toxin do routine labs comp CBC thyroid vitamin D and B12.  Drink lots of fluids.  Brat diet for now.  May also be from irritable bowel patient has lots of anxiety at present

## 2023-02-22 NOTE — ASSESSMENT & PLAN NOTE
Patient on any medications goal blood pressure is less than 140/90.  BP at clinic it was 130/84    Plan monitor blood pressure record and bring at next visit goal is less than 140/90 follow a low-salt diet states was on medications in the past will reevaluate blood pressure next visit and if needed restart meds.

## 2023-02-22 NOTE — PROGRESS NOTES
CHIEF COMPLAINT  Kiran Hensley  presents to [facility] to Establish Care (57 year old male here today to establish care. He complains of acid reflux getting worse in the last few weeks. /He complains of diarrhea X 2-3 weeks. States he had C Diff 3 years ago. )     HPI    57-year-old male here to establish care Main concern is reflux which has been getting worse the last 2 months and diarrhea for 2 to 3 weeks.  History of C. difficile 3 years-no recent ABX-    PCP was OSIEL Mena who is leaving from [facility].    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   Surgical History: has a past surgical history that includes Other surgical history; Laser ablation; Colonoscopy (2019); Esophagogastroduodenoscopy (2011); Ankle surgery; and Total knee arthroplasty.   Family History: family history includes Arthritis in his father and mother; Cancer in his paternal grandfather and paternal grandmother; Stroke in an other family member.   Social History: reports that he has never smoked. He has never used smokeless tobacco. He reports current alcohol use. He reports that he does not use drugs.  Social History     Social History Narrative     "Lives with spouse    Eats at least 2 meals per day     Exercise- when he can    Healthy sleep habits          Current Outpatient Medications:   •  DULoxetine (CYMBALTA) 60 MG capsule, Take 60 mg by mouth Daily. 1 tab PO Q AM, Disp: , Rfl:   •  pregabalin (LYRICA) 25 MG capsule, Take 25 mg by mouth 2 (Two) Times a Day. 1 tab PO BID, Disp: , Rfl:   •  tiZANidine (ZANAFLEX) 4 MG tablet, Take 4 mg by mouth Every 8 (Eight) Hours As Needed for Muscle Spasms. 1 tab PO Q 8 H, Disp: , Rfl:   •  pantoprazole (Protonix) 40 MG EC tablet, Take 1 tablet by mouth Daily. For 4 weeks then every other day-take 30 min before breakfasst, Disp: 30 tablet, Rfl: 2     OBJECTIVE  Vital Signs  Vitals:    02/22/23 1441   BP: 130/84   BP Location: Left arm   Patient Position: Sitting   Pulse: 68   Resp: 18   Temp: 97.3 °F (36.3 °C)   TempSrc: Temporal   SpO2: 97%   Weight: 96.5 kg (212 lb 12.8 oz)   Height: 182.9 cm (72\")      Body mass index is 28.86 kg/m².    Review of Systems significant for diarrhea chronic right heel pain and reflux symptoms and heartburn    Physical Exam  Vitals and nursing note reviewed.   Constitutional:       Appearance: Normal appearance.   HENT:      Head: Normocephalic and atraumatic.      Nose: Nose normal.   Eyes:      Extraocular Movements: Extraocular movements intact.      Pupils: Pupils are equal, round, and reactive to light.   Cardiovascular:      Rate and Rhythm: Normal rate and regular rhythm.   Pulmonary:      Effort: Pulmonary effort is normal.      Breath sounds: Normal breath sounds.   Abdominal:      General: Abdomen is flat.      Palpations: Abdomen is soft.   Musculoskeletal:      Cervical back: Normal range of motion and neck supple.   Skin:     General: Skin is warm and dry.   Neurological:      General: No focal deficit present.      Mental Status: He is alert and oriented to person, place, and time.   Psychiatric:         Mood and Affect: Mood normal.         Behavior: Behavior normal. "         RESULTS REVIEW  No results found for: PROBNP, BNP  CMP    CMP 3/8/22   Glucose 105 (A)   BUN 13   Creatinine 0.91   eGFR 97.7   Sodium 141   Potassium 4.6   Chloride 108 (A)   Calcium 9.2   Total Protein 7.1   Albumin 4.30   Globulin 2.8   Total Bilirubin 0.4   Alkaline Phosphatase 81   AST (SGOT) 26   ALT (SGPT) 25   Albumin/Globulin Ratio 1.5   BUN/Creatinine Ratio 14.3   Anion Gap 9.5   (A) Abnormal value       Comments are available for some flowsheets but are not being displayed.           CBC w/diff    CBC w/Diff 3/8/22   WBC 5.13   RBC 5.04   Hemoglobin 13.5   Hematocrit 42.4   MCV 84.1   MCH 26.8   MCHC 31.8   RDW 14.0   Platelets 231   Neutrophil Rel % 56.3   Immature Granulocyte Rel % 0.2   Lymphocyte Rel % 24.2   Monocyte Rel % 11.5   Eosinophil Rel % 6.2   Basophil Rel % 1.6 (A)   (A) Abnormal value             Lipid Panel    Lipid Panel 3/8/22   Total Cholesterol 198   Triglycerides 55   HDL Cholesterol 54   VLDL Cholesterol 10   LDL Cholesterol  134 (A)   LDL/HDL Ratio 2.46   (A) Abnormal value             Lab Results   Component Value Date    TSH 1.250 03/08/2022    TSH 1.750 12/28/2020      No results found for: FREET4      PSA    PSA 3/8/22   PSA 0.158            No Images in the past 120 days found..              ASSESSMENT & PLAN  Diagnoses and all orders for this visit:    1. Gastroesophageal reflux disease without esophagitis (Primary)  Comments:  We will also check a urea breath test.-Comp lipids CBC  Assessment & Plan:  Symptoms of reflux was using Tums as needed has bitter taste in his mouth at times not worse by eating spicy foods following reflux precautions last EGD was 10 years ago    Plan start pantoprazole 40 mg 30 minutes before meals for the next month and then 1 every other day.  We will see back in 4 weeks follow reflux precautions.    Orders:  -     Comprehensive Metabolic Panel; Future  -     Lipid Panel; Future  -     TSH+Free T4; Future  -     T3, Free; Future  -      Vitamin B12; Future  -     Folate; Future  -     Magnesium; Future  -     Vitamin D,25-Hydroxy; Future  -     CBC & Differential; Future  -     H. Pylori Breath Test - Breath, Lung; Future  -     pantoprazole (Protonix) 40 MG EC tablet; Take 1 tablet by mouth Daily. For 4 weeks then every other day-take 30 min before breakfasst  Dispense: 30 tablet; Refill: 2  -     Ambulatory Referral to Gastroenterology    2. 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    Orders:  -     Comprehensive Metabolic Panel; Future  -     Lipid Panel; Future  -     TSH+Free T4; Future  -     T3, Free; Future  -     Vitamin B12; Future  -     Folate; Future  -     Magnesium; Future  -     Vitamin D,25-Hydroxy; Future  -     CBC & Differential; Future  -     H. Pylori Breath Test - Breath, Lung; Future    3. Primary hypertension  Assessment & Plan:  Patient on any medications goal blood pressure is less than 140/90.  BP at clinic it was 130/84    Plan monitor blood pressure record and bring at next visit goal is less than 140/90 follow a low-salt diet states was on medications in the past will reevaluate blood pressure next visit and if needed restart meds.    Orders:  -     Comprehensive Metabolic Panel; Future  -     Lipid Panel; Future  -     TSH+Free T4; Future  -     T3, Free; Future  -     Vitamin B12; Future  -     Folate; Future  -     Magnesium; Future  -     Vitamin D,25-Hydroxy; Future  -     CBC & Differential; Future  -     H. Pylori Breath Test - Breath, Lung; Future    4. Diarrhea, unspecified type  Assessment & Plan:  Diarrhea for 2 to 3 weeks occurring 4-5 times per day.  History of C. difficile colitis years  ago.  Denies any recent antibiotic use.  Urinating every 6 hours per patient.    Plan-Will get stool for studies Shigella salmonella Campylobacter and E. coli, will also check for C. difficile toxin do routine labs comp CBC thyroid vitamin D and B12.  Drink lots of fluids.  Brat diet for now.  May also be from irritable bowel patient has lots of anxiety at present    Orders:  -     Comprehensive Metabolic Panel; Future  -     Lipid Panel; Future  -     TSH+Free T4; Future  -     T3, Free; Future  -     Vitamin B12; Future  -     Folate; Future  -     Magnesium; Future  -     Vitamin D,25-Hydroxy; Future  -     CBC & Differential; Future  -     H. Pylori Breath Test - Breath, Lung; Future  -     Fecal Leukocytes - Stool, Per Rectum; Future  -     Giardia / Cryptosporidium Screen - Stool, Per Rectum; Future  -     Enteric Bacterial Panel - Stool, Per Rectum; Future  -     Clostridioides difficile EIA - Stool, Per Rectum; Future  -     Stool Culture (Reference Lab) - Stool, Per Rectum; Future    5. Other dysphagia  Assessment & Plan:  Not controlled complaints of food getting stuck in the base of his neck like coleslaw, mashed potatoes for the past year occurring about once per 2 weeks.  No weight loss noted.  Last EGD by Dr. Cee showed inflammation and this was 10 years ago.    Plan -schedule appointment with Dr. Cee for reevaluation.  May need repeat EGD.  Asked patient to wallow slowly and avoid hard foods    Orders:  -     Ambulatory Referral to Gastroenterology      Patient Instructions   Do labs today  Start pantoprazole as directed  Refer to Dr. Cee for EGD and other evaluation     FOLLOW UP  Return in about 4 weeks (around 3/22/2023) for Recheck.    Patient was given instructions and counseling regarding his condition or for health maintenance advice. Please see specific information pulled into the AVS if appropriate.

## 2023-02-22 NOTE — ASSESSMENT & PLAN NOTE
Symptoms of reflux was using Tums as needed has bitter taste in his mouth at times not worse by eating spicy foods following reflux precautions last EGD was 10 years ago    Plan start pantoprazole 40 mg 30 minutes before meals for the next month and then 1 every other day.  We will see back in 4 weeks follow reflux precautions.

## 2023-02-23 ENCOUNTER — TELEPHONE (OUTPATIENT)
Dept: ORTHOPEDIC SURGERY | Facility: CLINIC | Age: 58
End: 2023-02-23
Payer: COMMERCIAL

## 2023-02-23 ENCOUNTER — LAB (OUTPATIENT)
Dept: LAB | Facility: HOSPITAL | Age: 58
End: 2023-02-23
Payer: COMMERCIAL

## 2023-02-23 DIAGNOSIS — R19.7 DIARRHEA, UNSPECIFIED TYPE: ICD-10-CM

## 2023-02-23 LAB
027 TOXIN: NORMAL
C DIFF TOX GENS STL QL NAA+PROBE: NEGATIVE
LACTOFERRIN STL QL LA: NEGATIVE

## 2023-02-23 PROCEDURE — 87506 IADNA-DNA/RNA PROBE TQ 6-11: CPT

## 2023-02-23 PROCEDURE — 83630 LACTOFERRIN FECAL (QUAL): CPT

## 2023-02-23 PROCEDURE — 87493 C DIFF AMPLIFIED PROBE: CPT

## 2023-02-23 NOTE — TELEPHONE ENCOUNTER
RT FOOT/HEEL FRACTURE, THIS IS W/C. RCVD/INDXD PROG NOTES 9/14/2022, 10/5/2022, 11/4/2022, 12/19/2022, 2/13/2023, OP REPORT 9/16/2022, RX ORDER 11/4/2022, PROCEDURE NOTES 1/17/2023

## 2023-02-24 LAB
C COLI+JEJ+UPSA DNA STL QL NAA+NON-PROBE: NOT DETECTED
CRYPTOSP DNA STL QL NAA+NON-PROBE: NOT DETECTED
E HISTOLYT DNA STL QL NAA+NON-PROBE: NOT DETECTED
EC STX1+STX2 GENES STL QL NAA+NON-PROBE: NOT DETECTED
G LAMBLIA DNA STL QL NAA+NON-PROBE: NOT DETECTED
S ENT+BONG DNA STL QL NAA+NON-PROBE: NOT DETECTED
SHIGELLA SP+EIEC IPAH ST NAA+NON-PROBE: NOT DETECTED

## 2023-03-20 NOTE — PROGRESS NOTES
"CHIEF COMPLAINT  Kiran Hensley  presents to [facility] for follow-up of Follow-up (57 year old male here today for a routine follow up on blood work and GERD. States the Protonix has helped with the GERD/States he has had a headache for the last 3 nights and blood pressure has been in the 150's. ).    HPI  57-year-old male seen for the first time last month as a new patient here for follow-up of chronic diarrhea, BP log to be reviewed, appointment with Dr. Wheeler for EGD for dysphagia, GERD pantoprazole started    Records reviewed, meds reviewed in detail, labs reviewed with patient.  Plan of care is as follows below.    GERD- better- with pantoprazole- stopped it after 5 days and not needing    Diarrhea -now 3-9x/d--starts after eating-no blood or mucus-had accident -fell off [employer] truck 9/2022-    H/o C dif 2 yrs ago-    PCP was OSIEL Mena who is leaving from [employer].     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 1 tab PO Q AM, Disp: , Rfl:   •  pantoprazole (Protonix) 40 MG EC tablet, Take 1 tablet by mouth Daily. For 4 weeks then every other day-take 30 min before breakfasst, Disp: 30 tablet, Rfl: 2  •  pregabalin " "(LYRICA) 25 MG capsule, Take 1 capsule by mouth 2 (Two) Times a Day. 1 tab PO BID, Disp: , Rfl:   •  tiZANidine (ZANAFLEX) 4 MG tablet, Take 1 tablet by mouth Every 8 (Eight) Hours As Needed for Muscle Spasms. 1 tab PO Q 8 H, Disp: , Rfl:   •  Cyanocobalamin (Vitamin B-12) 1000 MCG sublingual tablet, Place 1 tablet under the tongue Daily., Disp: 90 tablet, Rfl: 1  •  lisinopril (PRINIVIL,ZESTRIL) 5 MG tablet, Take 1 tablet by mouth Daily., Disp: 30 tablet, Rfl: 2  •  vitamin D (ERGOCALCIFEROL) 1.25 MG (37146 UT) capsule capsule, Take 1 capsule by mouth 1 (One) Time Per Week. x 6 months, Disp: 12 capsule, Rfl: 1   PFSH reviewed.  ROS -fatigue, HA    OBJECTIVE  Vital Signs  Vitals:    03/23/23 0921 03/23/23 1021   BP: 126/78 138/82   BP Location: Left arm Left arm   Patient Position: Sitting Sitting   Cuff Size:  Adult   Pulse: 74    Resp: 18    Temp: 98.6 °F (37 °C)    TempSrc: Temporal    SpO2: 98%    Weight: 99.2 kg (218 lb 12.8 oz)    Height: 72 cm (28.35\")       Body mass index is 191.45 kg/m².    Physical Exam  Vitals and nursing note reviewed.   Constitutional:       Appearance: Normal appearance.   HENT:      Head: Normocephalic and atraumatic.      Nose: Nose normal.   Eyes:      Extraocular Movements: Extraocular movements intact.      Pupils: Pupils are equal, round, and reactive to light.   Cardiovascular:      Rate and Rhythm: Normal rate and regular rhythm.   Pulmonary:      Effort: Pulmonary effort is normal.      Breath sounds: Normal breath sounds.   Abdominal:      General: Abdomen is flat.      Palpations: Abdomen is soft.   Musculoskeletal:      Cervical back: Normal range of motion and neck supple.   Skin:     General: Skin is warm and dry.   Neurological:      General: No focal deficit present.      Mental Status: He is alert and oriented to person, place, and time.   Psychiatric:         Mood and Affect: Mood normal.         Behavior: Behavior normal.          RESULTS REVIEW  No results found " for: PROBNP, BNP  CMP    CMP 2/22/23   Glucose 83   BUN 10   Creatinine 0.90   eGFR 98.4   Sodium 137   Potassium 3.9   Chloride 103   Calcium 9.4   Total Protein 7.4   Albumin 4.4   Globulin 3.0   Total Bilirubin 0.9   Alkaline Phosphatase 136 (A)   AST (SGOT) 19   ALT (SGPT) 23   Albumin/Globulin Ratio 1.5   BUN/Creatinine Ratio 11.1   Anion Gap 6.3   (A) Abnormal value            CBC w/diff    CBC w/Diff 2/22/23   WBC 4.80   RBC 5.19   Hemoglobin 13.4   Hematocrit 41.5   MCV 80.0   MCH 25.8 (A)   MCHC 32.3   RDW 15.5 (A)   Platelets 296   Neutrophil Rel % 57.7   Immature Granulocyte Rel % 0.4   Lymphocyte Rel % 25.8   Monocyte Rel % 10.6   Eosinophil Rel % 3.8   Basophil Rel % 1.7 (A)   (A) Abnormal value             Lipid Panel    Lipid Panel 2/22/23   Total Cholesterol 229 (A)   Triglycerides 66   HDL Cholesterol 64 (A)   VLDL Cholesterol 11   LDL Cholesterol  154 (A)   LDL/HDL Ratio 2.37   (A) Abnormal value             Lab Results   Component Value Date    TSH 0.783 02/22/2023    TSH 1.250 03/08/2022    TSH 1.750 12/28/2020      Lab Results   Component Value Date    FREET4 1.24 02/22/2023         Lab Results   Component Value Date    JYWAEGAP14 276 02/22/2023    EFQP13OO 17.5 (L) 02/22/2023    MG 2.3 02/22/2023        No Images in the past 120 days found..             ASSESSMENT & PLAN  Diagnoses and all orders for this visit:    1. Primary hypertension (Primary)  Comments:  start lisinopril 2.5 mg daily -monitor BP and record-Discusssed HTNa dn Cx-goal <130/80  Assessment & Plan:  Hypertension is high at home-BP =156/92 this am, 140-150s/80-88-no /83 on my exam    Orders:  -     lisinopril (PRINIVIL,ZESTRIL) 5 MG tablet; Take 1 tablet by mouth Daily.  Dispense: 30 tablet; Refill: 2    2. Gastroesophageal reflux disease without esophagitis  Comments:  use PPI prn-    3. Diarrhea, unspecified type  Comments:  chronic x 2 months-r/o infection  or other eti0--do stool studies-keep appt with GI next  week-BRAT diet-push fluids-  Orders:  -     Amylase; Future  -     Clostridioides difficile Toxin, PCR - Stool, Per Rectum; Future  -     Enteric Parasite Panel - Stool, Per Rectum; Future  -     Enteric Bacterial Panel - Stool, Per Rectum; Future  -     XR Abdomen KUB; Future    4. Vitamin B12 deficiency  -     Cyanocobalamin (Vitamin B-12) 1000 MCG sublingual tablet; Place 1 tablet under the tongue Daily.  Dispense: 90 tablet; Refill: 1    5. Vitamin D deficiency  -     vitamin D (ERGOCALCIFEROL) 1.25 MG (57471 UT) capsule capsule; Take 1 capsule by mouth 1 (One) Time Per Week. x 6 months  Dispense: 12 capsule; Refill: 1    Patient Instructions     Stool studies and KUB at hospital  [facility] diet  Start lisinopril 2.5 mg once a day-record blood pressure goal is less than 130/80  Appointment with GI next week  Start B12 once a day and vitamin D 50,000 units once a week       FOLLOW UP  Return in about 4 weeks (around 4/20/2023) for Recheck. to check BP and diarrhea--and to schedule 3-4 month check up and labs prior    Patient was given instructions and counseling regarding his condition or for health maintenance advice. Please see specific information pulled into the AVS if appropriate.

## 2023-03-21 NOTE — PROGRESS NOTES
Chief Complaint   Gastroesophageal reflux disease without esophagitis AND Other dysphagia      History of Present Illness       Kiran Hensley Sr is a 57 y.o. male who presents to [facility] for follow-up with a change in bowel habits, diarrhea, reflux, dysphagia.  Patient reports shattering his right heel a few months back and now being seen by pain management.  He was placed on Cymbalta, Lyrica and Zanaflex and noticed about 2 weeks later that he developed diarrhea.  Patient reports that he ran out of Cymbalta last week and his diarrhea resolved.  He had an x-ray performed that displayed a large stool burden.  Patient feels like he is not fully evacuating the bowel.  He reports good hydration throughout the day with water.  Patient does try to avoid NSAIDs.  He reports a history of reflux and takes Protonix on an as-needed basis which does provide relief.  Patient reports dysphagia that is occurring intermittently with foods such as coleslaw, apples and dry foods.  Patient denies fever, nausea, vomiting, weight loss, night sweats, melena, hematochezia, hematemesis.    Colonoscopy: Review of the patient's most recent colonoscopy performed by Dr. Rivas on 09.27.2019 normal repeat 10 years.    Most recent labs on 02.22.2023  Results       Result Review :       CMP    CMP 2/22/23   Glucose 83   BUN 10   Creatinine 0.90   eGFR 98.4   Sodium 137   Potassium 3.9   Chloride 103   Calcium 9.4   Total Protein 7.4   Albumin 4.4   Globulin 3.0   Total Bilirubin 0.9   Alkaline Phosphatase 136 (A)   AST (SGOT) 19   ALT (SGPT) 23   Albumin/Globulin Ratio 1.5   BUN/Creatinine Ratio 11.1   Anion Gap 6.3   (A) Abnormal value            CBC    CBC 2/22/23   WBC 4.80   RBC 5.19   Hemoglobin 13.4   Hematocrit 41.5   MCV 80.0   MCH 25.8 (A)   MCHC 32.3   RDW 15.5 (A)   Platelets 296   (A) Abnormal value                          Past Medical History       Past Medical History:   Diagnosis Date   •  Acid reflux    • Arthritis    • Diarrhea    • Hypertension        Past Surgical History:   Procedure Laterality Date   • ANKLE SURGERY Right    • ANKLE SURGERY Left    • CARPAL TUNNEL RELEASE Bilateral    • COLONOSCOPY  2019   • ENDOSCOPY  2011    Dr. Monteiro   • HAND SURGERY Left     thumb pins   • LASER ABLATION     • OTHER SURGICAL HISTORY      metal implants   • ROTATOR CUFF REPAIR Left    • TOE SURGERY Left    • TOTAL KNEE ARTHROPLASTY Bilateral          Current Outpatient Medications:   •  Cyanocobalamin (Vitamin B-12) 1000 MCG sublingual tablet, Place 1 tablet under the tongue Daily., Disp: 90 tablet, Rfl: 1  •  DULoxetine (CYMBALTA) 60 MG capsule, Take 1 capsule by mouth Daily. 1 tab PO Q AM, Disp: , Rfl:   •  ibuprofen (ADVIL,MOTRIN) 800 MG tablet, 1 tablet., Disp: , Rfl:   •  lisinopril (PRINIVIL,ZESTRIL) 5 MG tablet, Take 1 tablet by mouth Daily., Disp: 30 tablet, Rfl: 2  •  magnesium citrate solution, 150 mL., Disp: , Rfl:   •  pantoprazole (Protonix) 40 MG EC tablet, Take 1 tablet by mouth Daily. For 4 weeks then every other day-take 30 min before breakfasst, Disp: 30 tablet, Rfl: 2  •  pregabalin (LYRICA) 25 MG capsule, Take 1 capsule by mouth 2 (Two) Times a Day. 1 tab PO BID, Disp: , Rfl:   •  tiZANidine (ZANAFLEX) 4 MG tablet, Take 1 tablet by mouth Every 8 (Eight) Hours As Needed for Muscle Spasms. 1 tab PO Q 8 H, Disp: , Rfl:   •  vitamin D (ERGOCALCIFEROL) 1.25 MG (63166 UT) capsule capsule, Take 1 capsule by mouth 1 (One) Time Per Week. x 6 months, Disp: 12 capsule, Rfl: 1  •  Sod Picosulfate-Mag Ox-Cit Acd (Clenpiq) 10-3.5-12 MG-GM -GM/160ML solution, Take 160 mL by mouth 1 (One) Time for 1 dose., Disp: 320 mL, Rfl: 0     Allergies   Allergen Reactions   • Gabapentin Hives   • Adhesive Tape Rash       Family History   Problem Relation Age of Onset   • Arthritis Mother    • Arthritis Father    • Cancer Paternal Grandmother    • Cancer Paternal Grandfather    • Stroke Other         Uncle   • Colon  "cancer Neg Hx         Social History     Social History Narrative    Lives with spouse    Eats at least 2 meals per day     Exercise- when he can    Healthy sleep habits        Objective   Vital Signs:   /83 (BP Location: Left arm, Patient Position: Sitting, Cuff Size: Adult)   Pulse 80   Ht 182.9 cm (72\")   Wt 98.4 kg (217 lb)   SpO2 100%   BMI 29.43 kg/m²       Physical Exam  Constitutional:       General: He is not in acute distress.     Appearance: Normal appearance. He is well-developed and normal weight.   Eyes:      Conjunctiva/sclera: Conjunctivae normal.      Pupils: Pupils are equal, round, and reactive to light.      Visual Fields: Right eye visual fields normal and left eye visual fields normal.   Cardiovascular:      Rate and Rhythm: Normal rate and regular rhythm.      Heart sounds: Normal heart sounds.   Pulmonary:      Effort: Pulmonary effort is normal. No retractions.      Breath sounds: Normal breath sounds and air entry.      Comments: Inspection of chest: normal appearance  Abdominal:      General: Bowel sounds are normal.      Palpations: Abdomen is soft.      Tenderness: There is no abdominal tenderness.      Comments: No appreciable hepatosplenomegaly   Musculoskeletal:      Cervical back: Neck supple.      Right lower leg: No edema.      Left lower leg: No edema.   Lymphadenopathy:      Cervical: No cervical adenopathy.   Skin:     Findings: No lesion.      Comments: Turgor normal   Neurological:      Mental Status: He is alert and oriented to person, place, and time.   Psychiatric:         Mood and Affect: Mood and affect normal.           Assessment & Plan          Assessment and Plan    Diagnoses and all orders for this visit:    1. Altered bowel habits (Primary)    2. Diarrhea, unspecified type    3. Gastroesophageal reflux disease, unspecified whether esophagitis present    4. Oropharyngeal dysphagia    5. Change in bowel habits    Other orders  -     Sod Picosulfate-Mag " Ox-Cit Acd (Clenpiq) 10-3.5-12 MG-GM -GM/160ML solution; Take 160 mL by mouth 1 (One) Time for 1 dose.  Dispense: 320 mL; Refill: 0      57-year-old male presenting the office today with a history of diarrhea, change in bowel habits, reflux and dysphagia.  I have recommended that the patient undergo further evaluation with an EGD and colonoscopy.  I have discussed this procedure in detail with the patient.  I have discussed the risks, benefits and alternatives.  I have discussed the risk of anesthesia, bleeding and perforation.  Patient understands these risks, benefits and alternatives and wishes to proceed.  I will schedule him at his earliest convenience.  Patient will add fiber to his current regimen to help with complete evacuation.  Patient will continue Protonix as prescribed.  Patient will continue to avoid NSAIDs.  Patient will follow-up after endoscopy.  Patient agreeable to this plan will call with any questions or concerns.          Follow Up       Follow Up   Return for Follow up after endoscopy in office.  Patient was given instructions and counseling regarding his condition or for health maintenance advice. Please see specific information pulled into the AVS if appropriate.

## 2023-03-23 ENCOUNTER — OFFICE VISIT (OUTPATIENT)
Dept: INTERNAL MEDICINE | Facility: CLINIC | Age: 58
End: 2023-03-23
Payer: COMMERCIAL

## 2023-03-23 ENCOUNTER — HOSPITAL ENCOUNTER (OUTPATIENT)
Dept: GENERAL RADIOLOGY | Facility: HOSPITAL | Age: 58
Discharge: HOME | End: 2023-03-23
Payer: COMMERCIAL

## 2023-03-23 ENCOUNTER — LAB (OUTPATIENT)
Dept: LAB | Facility: HOSPITAL | Age: 58
End: 2023-03-23
Payer: COMMERCIAL

## 2023-03-23 VITALS
TEMPERATURE: 98.6 F | WEIGHT: 218.8 LBS | BODY MASS INDEX: 42.96 KG/M2 | HEIGHT: 60 IN | SYSTOLIC BLOOD PRESSURE: 138 MMHG | HEART RATE: 74 BPM | OXYGEN SATURATION: 98 % | RESPIRATION RATE: 18 BRPM | DIASTOLIC BLOOD PRESSURE: 82 MMHG

## 2023-03-23 DIAGNOSIS — R19.7 DIARRHEA, UNSPECIFIED TYPE: ICD-10-CM

## 2023-03-23 DIAGNOSIS — E55.9 VITAMIN D DEFICIENCY: ICD-10-CM

## 2023-03-23 DIAGNOSIS — E53.8 VITAMIN B12 DEFICIENCY: ICD-10-CM

## 2023-03-23 DIAGNOSIS — I10 PRIMARY HYPERTENSION: Primary | ICD-10-CM

## 2023-03-23 DIAGNOSIS — K21.9 GASTROESOPHAGEAL REFLUX DISEASE WITHOUT ESOPHAGITIS: ICD-10-CM

## 2023-03-23 DIAGNOSIS — K59.04 CHRONIC IDIOPATHIC CONSTIPATION: Primary | ICD-10-CM

## 2023-03-23 LAB — AMYLASE SERPL-CCNC: 47 U/L (ref 28–100)

## 2023-03-23 PROCEDURE — 82150 ASSAY OF AMYLASE: CPT

## 2023-03-23 PROCEDURE — 99214 OFFICE O/P EST MOD 30 MIN: CPT | Performed by: INTERNAL MEDICINE

## 2023-03-23 PROCEDURE — 74018 RADEX ABDOMEN 1 VIEW: CPT

## 2023-03-23 PROCEDURE — 36415 COLL VENOUS BLD VENIPUNCTURE: CPT

## 2023-03-23 RX ORDER — LISINOPRIL 5 MG/1
5 TABLET ORAL DAILY
Qty: 30 TABLET | Refills: 2 | Status: SHIPPED | OUTPATIENT
Start: 2023-03-23

## 2023-03-23 RX ORDER — MAGNESIUM 200 MG
1 TABLET ORAL DAILY
Qty: 90 TABLET | Refills: 1 | Status: SHIPPED | OUTPATIENT
Start: 2023-03-23

## 2023-03-23 RX ORDER — ERGOCALCIFEROL 1.25 MG/1
50000 CAPSULE ORAL WEEKLY
Qty: 12 CAPSULE | Refills: 1 | Status: SHIPPED | OUTPATIENT
Start: 2023-03-23

## 2023-03-23 NOTE — PATIENT INSTRUCTIONS
Stool studies and KUB at [facility] diet  Start lisinopril 2.5 mg once a day-record blood pressure goal is less than 130/80  Appointment with GI next week  Start B12 once a day and vitamin D 50,000 units once a week

## 2023-03-27 ENCOUNTER — PREP FOR SURGERY (OUTPATIENT)
Dept: OTHER | Facility: HOSPITAL | Age: 58
End: 2023-03-27
Payer: COMMERCIAL

## 2023-03-27 ENCOUNTER — OFFICE VISIT (OUTPATIENT)
Dept: GASTROENTEROLOGY | Facility: CLINIC | Age: 58
End: 2023-03-27
Payer: COMMERCIAL

## 2023-03-27 VITALS
DIASTOLIC BLOOD PRESSURE: 83 MMHG | WEIGHT: 217 LBS | SYSTOLIC BLOOD PRESSURE: 145 MMHG | BODY MASS INDEX: 29.39 KG/M2 | OXYGEN SATURATION: 100 % | HEIGHT: 72 IN | HEART RATE: 80 BPM

## 2023-03-27 DIAGNOSIS — R19.4 CHANGE IN BOWEL HABITS: ICD-10-CM

## 2023-03-27 DIAGNOSIS — K59.04 CHRONIC IDIOPATHIC CONSTIPATION: ICD-10-CM

## 2023-03-27 DIAGNOSIS — R19.7 DIARRHEA, UNSPECIFIED TYPE: ICD-10-CM

## 2023-03-27 DIAGNOSIS — R19.4 ALTERED BOWEL HABITS: Primary | ICD-10-CM

## 2023-03-27 DIAGNOSIS — R13.12 OROPHARYNGEAL DYSPHAGIA: ICD-10-CM

## 2023-03-27 DIAGNOSIS — K21.9 GASTROESOPHAGEAL REFLUX DISEASE, UNSPECIFIED WHETHER ESOPHAGITIS PRESENT: ICD-10-CM

## 2023-03-27 RX ORDER — IBUPROFEN 800 MG/1
800 TABLET ORAL
COMMUNITY
Start: 2022-12-19

## 2023-03-27 RX ORDER — MAGNESIUM CITRATE
8.72 SOLUTION, ORAL ORAL
COMMUNITY
Start: 2022-09-22

## 2023-03-27 RX ORDER — SODIUM PICOSULFATE, MAGNESIUM OXIDE, AND ANHYDROUS CITRIC ACID 10; 3.5; 12 MG/160ML; G/160ML; G/160ML
160 LIQUID ORAL ONCE
Qty: 320 ML | Refills: 0 | Status: SHIPPED | OUTPATIENT
Start: 2023-03-27 | End: 2023-03-27

## 2023-03-27 RX ORDER — DOCUSATE SODIUM 100 MG/1
100 CAPSULE, LIQUID FILLED ORAL 2 TIMES DAILY
Qty: 60 CAPSULE | Refills: 1 | Status: SHIPPED | OUTPATIENT
Start: 2023-03-27

## 2023-03-28 PROBLEM — K59.04 CHRONIC IDIOPATHIC CONSTIPATION: Status: ACTIVE | Noted: 2023-03-28

## 2023-04-13 ENCOUNTER — HOSPITAL ENCOUNTER (OUTPATIENT)
Dept: GENERAL RADIOLOGY | Facility: HOSPITAL | Age: 58
Discharge: HOME | End: 2023-04-13
Admitting: INTERNAL MEDICINE
Payer: COMMERCIAL

## 2023-04-13 ENCOUNTER — OFFICE VISIT (OUTPATIENT)
Dept: INTERNAL MEDICINE | Facility: CLINIC | Age: 58
End: 2023-04-13
Payer: COMMERCIAL

## 2023-04-13 VITALS
SYSTOLIC BLOOD PRESSURE: 124 MMHG | WEIGHT: 218 LBS | DIASTOLIC BLOOD PRESSURE: 80 MMHG | RESPIRATION RATE: 18 BRPM | HEIGHT: 72 IN | HEART RATE: 68 BPM | OXYGEN SATURATION: 98 % | BODY MASS INDEX: 29.53 KG/M2 | TEMPERATURE: 97.6 F

## 2023-04-13 DIAGNOSIS — M25.572 CHRONIC PAIN OF LEFT ANKLE: ICD-10-CM

## 2023-04-13 DIAGNOSIS — G89.29 CHRONIC PAIN OF LEFT ANKLE: ICD-10-CM

## 2023-04-13 DIAGNOSIS — E78.00 PURE HYPERCHOLESTEROLEMIA: ICD-10-CM

## 2023-04-13 DIAGNOSIS — R19.7 DIARRHEA, UNSPECIFIED TYPE: ICD-10-CM

## 2023-04-13 DIAGNOSIS — I10 PRIMARY HYPERTENSION: Primary | ICD-10-CM

## 2023-04-13 DIAGNOSIS — E55.9 VITAMIN D DEFICIENCY: ICD-10-CM

## 2023-04-13 PROCEDURE — 99214 OFFICE O/P EST MOD 30 MIN: CPT | Performed by: INTERNAL MEDICINE

## 2023-04-13 PROCEDURE — 73610 X-RAY EXAM OF ANKLE: CPT

## 2023-04-13 RX ORDER — LISINOPRIL 2.5 MG/1
2.5 TABLET ORAL DAILY
Qty: 90 TABLET | Refills: 1 | Status: SHIPPED | OUTPATIENT
Start: 2023-04-13

## 2023-04-13 NOTE — PROGRESS NOTES
CHIEF COMPLAINT  Kiran Hensley Sr presents to [facility] MEDICINE for follow-up of Follow-up (57 year old male here today for a routine follow up. States he is doing much better. /He complains of pain in his left ankle X several years. States it is getting worse. /He brought in his home blood pressure logs for review ).    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       Current Outpatient Medications:   •  Cyanocobalamin (Vitamin B-12) 1000 MCG sublingual tablet, Place 1 tablet under the tongue Daily., Disp: 90 tablet, Rfl: 1  •  DULoxetine (CYMBALTA) 60 MG capsule, Take 1 capsule by mouth Daily. 1 tab PO Q AM, Disp: , Rfl:   •  ibuprofen (ADVIL,MOTRIN) 800 MG tablet, 1 tablet., Disp: , Rfl:   •  pantoprazole (Protonix) 40 MG EC tablet, Take 1 tablet by mouth Daily. For 4 weeks then every other day-take 30 min before breakfasst, Disp: 30 tablet, Rfl: 2  •  pregabalin (LYRICA) 25 MG capsule, Take 1 capsule by mouth 2 (Two) Times a Day. 1 tab PO BID, Disp: , Rfl:   •  tiZANidine (ZANAFLEX) 4 MG tablet, Take 1 tablet by mouth Every 8 (Eight) Hours As Needed for Muscle Spasms. 1 tab PO Q 8 H, Disp: , Rfl:   •  vitamin D  "(ERGOCALCIFEROL) 1.25 MG (25724 UT) capsule capsule, Take 1 capsule by mouth 1 (One) Time Per Week. x 6 months, Disp: 12 capsule, Rfl: 1  •  lisinopril (PRINIVIL,ZESTRIL) 2.5 MG tablet, Take 1 tablet by mouth Daily., Disp: 90 tablet, Rfl: 1   PFSH reviewed.      OBJECTIVE  Vital Signs  Vitals:    04/13/23 0907   BP: 124/80   BP Location: Left arm   Patient Position: Sitting   Pulse: 68   Resp: 18   Temp: 97.6 °F (36.4 °C)   TempSrc: Temporal   SpO2: 98%   Weight: 98.9 kg (218 lb)   Height: 182.9 cm (72\")      Body mass index is 29.57 kg/m².    Physical Exam  Vitals and nursing note reviewed.   Constitutional:       Appearance: Normal appearance.   HENT:      Head: Normocephalic and atraumatic.      Nose: Nose normal.   Eyes:      Extraocular Movements: Extraocular movements intact.      Pupils: Pupils are equal, round, and reactive to light.   Cardiovascular:      Rate and Rhythm: Normal rate and regular rhythm.   Pulmonary:      Effort: Pulmonary effort is normal.      Breath sounds: Normal breath sounds.   Abdominal:      General: Abdomen is flat.      Palpations: Abdomen is soft.   Musculoskeletal:      Cervical back: Normal range of motion and neck supple.      Comments: Right foot ankle with chronic changes due to injury from accident at work when he fell.  Good pulses posterior tibialis and dorsalis pedis.  Decreased range of motion due to previous injury  Left foot ankle with good range of motion   Skin:     General: Skin is warm and dry.   Neurological:      General: No focal deficit present.      Mental Status: He is alert and oriented to person, place, and time.   Psychiatric:         Mood and Affect: Mood normal.         Behavior: Behavior normal.          RESULTS REVIEW  No results found for: PROBNP, BNP  CMP        2/22/2023    15:53   CMP   Glucose 83     BUN 10     Creatinine 0.90     EGFR 98.4     Sodium 137     Potassium 3.9     Chloride 103     Calcium 9.4     Total Protein 7.4     Albumin 4.4   "   Globulin 3.0     Total Bilirubin 0.9     Alkaline Phosphatase 136     AST (SGOT) 19     ALT (SGPT) 23     Albumin/Globulin Ratio 1.5     BUN/Creatinine Ratio 11.1     Anion Gap 6.3       CBC w/diff        2/22/2023    16:03   CBC w/Diff   WBC 4.80     RBC 5.19     Hemoglobin 13.4     Hematocrit 41.5     MCV 80.0     MCH 25.8     MCHC 32.3     RDW 15.5     Platelets 296     Neutrophil Rel % 57.7     Immature Granulocyte Rel % 0.4     Lymphocyte Rel % 25.8     Monocyte Rel % 10.6     Eosinophil Rel % 3.8     Basophil Rel % 1.7        Lipid Panel        2/22/2023    15:53   Lipid Panel   Total Cholesterol 229     Triglycerides 66     HDL Cholesterol 64     VLDL Cholesterol 11     LDL Cholesterol  154     LDL/HDL Ratio 2.37        Lab Results   Component Value Date    TSH 0.783 02/22/2023    TSH 1.250 03/08/2022    TSH 1.750 12/28/2020      Lab Results   Component Value Date    FREET4 1.24 02/22/2023         Lab Results   Component Value Date    [id number] 276 02/22/2023    YXHG95QF 17.5 (L) 02/22/2023    MG 2.3 02/22/2023        XR Abdomen KUB    Result Date: 3/23/2023   Large stool burden, otherwise unremarkable bowel gas pattern.     ALEAH HOPE DO       Electronically Signed and Approved By: ALEAH HOPE DO on 3/23/2023 at 17:28                        ASSESSMENT & PLAN  Diagnoses and all orders for this visit:    1. Primary hypertension (Primary)  Comments:  Stable can decrease lisinopril to 2.5 mg once a day has been taking the 5 mg the past week and stop blood pressure 115 this morning.  monitor BP-goal <140/80  Assessment & Plan:  Hypertension is stable.  Reviewed blood pressure log and ranging from 121-141/79-88 without lisinopril.  Started taking it about a week ago and blood pressure 2 days ago was 118/82.    Plan-goal weight 200lbs--discussed lifestyle changes low-salt diet still eating out once a week [employer] restaurant usually-decrease lisinopril to 2.5 mg once a day monitor blood pressure if  systolic blood pressure less than 100 hold medication.    Orders:  -     lisinopril (PRINIVIL,ZESTRIL) 2.5 MG tablet; Take 1 tablet by mouth Daily.  Dispense: 90 tablet; Refill: 1  -     Vitamin D,25-Hydroxy; Future  -     Lipid Panel; Future  -     Comprehensive Metabolic Panel; Future    2. Diarrhea, unspecified type  Comments:  neg stool studies--appt for cscope 5/18/2023-no more diarrhea for the last few weeks.   Orders:  -     Vitamin D,25-Hydroxy; Future  -     Lipid Panel; Future  -     Comprehensive Metabolic Panel; Future    3. Pure hypercholesterolemia  Comments:  Follow low-cholesterol diet HDL is greater than 40.  Recheck in 4 months.  Orders:  -     Vitamin D,25-Hydroxy; Future  -     Lipid Panel; Future  -     Comprehensive Metabolic Panel; Future    4. Vitamin D deficiency  Comments:  Continue with high-dose vitamin D as directed then maintenance vitamin D3 2000 units daily recheck levels in 4 months.  Orders:  -     Vitamin D,25-Hydroxy; Future  -     Lipid Panel; Future  -     Comprehensive Metabolic Panel; Future    5. Chronic pain of left ankle  Comments:  X-ray August 2022 with moderate osteoarthritis.  Missed appointment with [facility].  Refer back to them-continue with NSAIDs/Lyrica-Cymbalta  Orders:  -     Ambulatory Referral to Podiatry  -     XR Ankle 3+ View Left; Future  -     Vitamin D,25-Hydroxy; Future  -     Lipid Panel; Future  -     Comprehensive Metabolic Panel; Future          Patient Instructions   Decrease lisinopril to 2.5 mg once a day  Continue to monitor blood pressure goal is less than 140/80  Get x-rays of left ankle today  Refer to [facility] patient preference  Try to lose 5 to 10 pounds by next visit  Follow-up in 4 months with labs prior okay  Continue with vitamin D once a week as directed then daily vitamin D.     Schedule f/u appt in 3-4 months with labs prior    FOLLOW UP  Return in about 4 months (around 8/13/2023) for Recheck.    Patient  was given instructions and counseling regarding his condition or for health maintenance advice. Please see specific information pulled into the AVS if appropriate.

## 2023-04-13 NOTE — ASSESSMENT & PLAN NOTE
Hypertension is stable.  Reviewed blood pressure log and ranging from 121-141/79-88 without lisinopril.  Started taking it about a week ago and blood pressure 2 days ago was 118/82.    Plan-goal weight 200lbs--discussed lifestyle changes low-salt diet still eating out once a week [employer] restaurant usually-decrease lisinopril to 2.5 mg once a day monitor blood pressure if systolic blood pressure less than 100 hold medication.

## 2023-04-13 NOTE — PATIENT INSTRUCTIONS
Decrease lisinopril to 2.5 mg once a day  Continue to monitor blood pressure goal is less than 140/80  Get x-rays of left ankle today  Refer to [facility] patient preference  Try to lose 5 to 10 pounds by next visit  Follow-up in 4 months with labs prior okay  Continue with vitamin D once a week as directed then daily vitamin D.

## 2023-05-15 NOTE — PRE-PROCEDURE INSTRUCTIONS
Spoke with pt about upcoming procedure, preparation for, where to come and what time, home medications, and what to bring

## 2023-05-18 ENCOUNTER — HOSPITAL ENCOUNTER (OUTPATIENT)
Facility: HOSPITAL | Age: 58
Setting detail: HOSPITAL OUTPATIENT SURGERY
Discharge: HOME | End: 2023-05-18
Attending: INTERNAL MEDICINE | Admitting: INTERNAL MEDICINE
Payer: COMMERCIAL

## 2023-05-18 ENCOUNTER — ANESTHESIA (OUTPATIENT)
Dept: GASTROENTEROLOGY | Facility: HOSPITAL | Age: 58
End: 2023-05-18
Payer: COMMERCIAL

## 2023-05-18 ENCOUNTER — ANESTHESIA EVENT (OUTPATIENT)
Dept: GASTROENTEROLOGY | Facility: HOSPITAL | Age: 58
End: 2023-05-18
Payer: COMMERCIAL

## 2023-05-18 VITALS
BODY MASS INDEX: 28.05 KG/M2 | OXYGEN SATURATION: 97 % | WEIGHT: 206.79 LBS | TEMPERATURE: 97.1 F | SYSTOLIC BLOOD PRESSURE: 118 MMHG | HEART RATE: 84 BPM | DIASTOLIC BLOOD PRESSURE: 95 MMHG | RESPIRATION RATE: 22 BRPM

## 2023-05-18 DIAGNOSIS — R19.4 CHANGE IN BOWEL HABITS: ICD-10-CM

## 2023-05-18 DIAGNOSIS — R19.4 ALTERED BOWEL HABITS: ICD-10-CM

## 2023-05-18 DIAGNOSIS — R19.7 DIARRHEA, UNSPECIFIED TYPE: ICD-10-CM

## 2023-05-18 DIAGNOSIS — K21.9 GASTROESOPHAGEAL REFLUX DISEASE, UNSPECIFIED WHETHER ESOPHAGITIS PRESENT: ICD-10-CM

## 2023-05-18 DIAGNOSIS — K59.04 CHRONIC IDIOPATHIC CONSTIPATION: ICD-10-CM

## 2023-05-18 DIAGNOSIS — R13.12 OROPHARYNGEAL DYSPHAGIA: ICD-10-CM

## 2023-05-18 PROCEDURE — 43239 EGD BIOPSY SINGLE/MULTIPLE: CPT | Performed by: INTERNAL MEDICINE

## 2023-05-18 PROCEDURE — 88305 TISSUE EXAM BY PATHOLOGIST: CPT | Performed by: INTERNAL MEDICINE

## 2023-05-18 PROCEDURE — 25010000002 DEXAMETHASONE PER 1 MG: Performed by: NURSE ANESTHETIST, CERTIFIED REGISTERED

## 2023-05-18 PROCEDURE — 45380 COLONOSCOPY AND BIOPSY: CPT | Performed by: INTERNAL MEDICINE

## 2023-05-18 PROCEDURE — 25010000002 PROPOFOL 10 MG/ML EMULSION: Performed by: NURSE ANESTHETIST, CERTIFIED REGISTERED

## 2023-05-18 RX ORDER — PROPOFOL 10 MG/ML
VIAL (ML) INTRAVENOUS AS NEEDED
Status: DISCONTINUED | OUTPATIENT
Start: 2023-05-18 | End: 2023-05-18 | Stop reason: SURG

## 2023-05-18 RX ORDER — KETAMINE HCL IN NACL, ISO-OSM 100MG/10ML
SYRINGE (ML) INJECTION AS NEEDED
Status: DISCONTINUED | OUTPATIENT
Start: 2023-05-18 | End: 2023-05-18 | Stop reason: SURG

## 2023-05-18 RX ORDER — GLYCOPYRROLATE 0.2 MG/ML
INJECTION INTRAMUSCULAR; INTRAVENOUS AS NEEDED
Status: DISCONTINUED | OUTPATIENT
Start: 2023-05-18 | End: 2023-05-18 | Stop reason: SURG

## 2023-05-18 RX ORDER — SUCCINYLCHOLINE/SOD CL,ISO/PF 100 MG/5ML
SYRINGE (ML) INTRAVENOUS AS NEEDED
Status: DISCONTINUED | OUTPATIENT
Start: 2023-05-18 | End: 2023-05-18 | Stop reason: SURG

## 2023-05-18 RX ORDER — SODIUM CHLORIDE, SODIUM LACTATE, POTASSIUM CHLORIDE, CALCIUM CHLORIDE 600; 310; 30; 20 MG/100ML; MG/100ML; MG/100ML; MG/100ML
30 INJECTION, SOLUTION INTRAVENOUS CONTINUOUS
Status: DISCONTINUED | OUTPATIENT
Start: 2023-05-18 | End: 2023-05-18 | Stop reason: HOSPADM

## 2023-05-18 RX ORDER — DEXAMETHASONE SODIUM PHOSPHATE 4 MG/ML
INJECTION, SOLUTION INTRA-ARTICULAR; INTRALESIONAL; INTRAMUSCULAR; INTRAVENOUS; SOFT TISSUE AS NEEDED
Status: DISCONTINUED | OUTPATIENT
Start: 2023-05-18 | End: 2023-05-18 | Stop reason: SURG

## 2023-05-18 RX ORDER — LIDOCAINE HYDROCHLORIDE 20 MG/ML
INJECTION, SOLUTION EPIDURAL; INFILTRATION; INTRACAUDAL; PERINEURAL AS NEEDED
Status: DISCONTINUED | OUTPATIENT
Start: 2023-05-18 | End: 2023-05-18 | Stop reason: SURG

## 2023-05-18 RX ADMIN — SODIUM CHLORIDE, POTASSIUM CHLORIDE, SODIUM LACTATE AND CALCIUM CHLORIDE 30 ML/HR: 600; 310; 30; 20 INJECTION, SOLUTION INTRAVENOUS at 13:17

## 2023-05-18 RX ADMIN — PROPOFOL 100 MG: 10 INJECTION, EMULSION INTRAVENOUS at 14:21

## 2023-05-18 RX ADMIN — Medication 40 MG: at 14:34

## 2023-05-18 RX ADMIN — Medication 30 MG: at 14:39

## 2023-05-18 RX ADMIN — GLYCOPYRROLATE 0.2 MG: 0.2 INJECTION INTRAMUSCULAR; INTRAVENOUS at 14:42

## 2023-05-18 RX ADMIN — Medication 20 MG: at 14:33

## 2023-05-18 RX ADMIN — PROPOFOL 150 MCG/KG/MIN: 10 INJECTION, EMULSION INTRAVENOUS at 14:21

## 2023-05-18 RX ADMIN — DEXAMETHASONE SODIUM PHOSPHATE 4 MG: 4 INJECTION, SOLUTION INTRA-ARTICULAR; INTRALESIONAL; INTRAMUSCULAR; INTRAVENOUS; SOFT TISSUE at 14:42

## 2023-05-18 RX ADMIN — LIDOCAINE HYDROCHLORIDE 50 MG: 20 INJECTION, SOLUTION EPIDURAL; INFILTRATION; INTRACAUDAL; PERINEURAL at 14:21

## 2023-05-18 NOTE — ANESTHESIA POSTPROCEDURE EVALUATION
Patient: Kiran Hensley Sr    Procedure Summary     Date: 05/18/23 Room / Location: [facility] ENDOSCOPY 2 / [facility] ENDOSCOPY    Anesthesia Start: 1418 Anesthesia Stop: 1506    Procedures:       ESOPHAGOGASTRODUODENOSCOPY with biposies      COLONOSCOPY WITH BIPOSIES Diagnosis:       Altered bowel habits      Diarrhea, unspecified type      Gastroesophageal reflux disease, unspecified whether esophagitis present      Change in bowel habits      Oropharyngeal dysphagia      Chronic idiopathic constipation      (Altered bowel habits [R19.4])      (Diarrhea, unspecified type [R19.7])      (Gastroesophageal reflux disease, unspecified whether esophagitis present [K21.9])      (Change in bowel habits [R19.4])      (Oropharyngeal dysphagia [R13.12])      (Chronic idiopathic constipation [K59.04])    Surgeons: Lemuel Rivas MD Provider: Ryne Horowitz MD    Anesthesia Type: general ASA Status: 2          Anesthesia Type: general    Vitals  Vitals Value Taken Time   /95 05/18/23 1522   Temp 36.2 °C (97.1 °F) 05/18/23 1502   Pulse 84 05/18/23 1522   Resp 22 05/18/23 1522   SpO2 97 % 05/18/23 1522           Post Anesthesia Care and Evaluation    Patient location during evaluation: bedside  Patient participation: complete - patient participated  Level of consciousness: awake  Pain management: adequate    Airway patency: patent  PONV Status: none  Cardiovascular status: acceptable and stable  Respiratory status: acceptable  Hydration status: acceptable    Comments: An Anesthesiologist personally participated in the most demanding procedures (including induction and emergence if applicable) in the anesthesia plan, monitored the course of anesthesia administration at frequent intervals and remained physically present and available for immediate diagnosis and treatment of emergencies.       Laryngospasm after induction, before started case.  (see intraop record).  LMA and succinylcholine give, and case proceed  smoothly.  Awake and alert post op, no SOA

## 2023-05-18 NOTE — H&P
Pre Procedure History & Physical    Chief Complaint:   gerd  Change in bowel habits  Dysphagia  Diarrhea    Subjective     HPI:   As above    Past Medical History:   Past Medical History:   Diagnosis Date   • Acid reflux    • Arthritis    • Diarrhea    • Hypertension        Past Surgical History:  Past Surgical History:   Procedure Laterality Date   • ANKLE SURGERY Right    • ANKLE SURGERY Left    • CARPAL TUNNEL RELEASE Bilateral    • COLONOSCOPY  2019   • ENDOSCOPY  2011    Dr. Monteiro   • HAND SURGERY Left     thumb pins   • LASER ABLATION     • OTHER SURGICAL HISTORY      metal implants   • ROTATOR CUFF REPAIR Left    • TOE SURGERY Left    • TOTAL KNEE ARTHROPLASTY Bilateral    • TOTAL KNEE ARTHROPLASTY Bilateral     knee replacement       Family History:  Family History   Problem Relation Age of Onset   • Arthritis Mother    • Arthritis Father    • Cancer Paternal Grandmother    • Cancer Paternal Grandfather    • Stroke Other         Uncle   • Colon cancer Neg Hx        Social History:   reports that he has never smoked. He has never been exposed to tobacco smoke. He has never used smokeless tobacco. He reports current alcohol use. He reports that he does not use drugs.    Medications:   Medications Prior to Admission   Medication Sig Dispense Refill Last Dose   • Cyanocobalamin (Vitamin B-12) 1000 MCG sublingual tablet Place 1 tablet under the tongue Daily. 90 tablet 1 5/17/2023   • DULoxetine (CYMBALTA) 60 MG capsule Take 1 capsule by mouth Daily. 1 tab PO Q AM   5/17/2023   • lisinopril (PRINIVIL,ZESTRIL) 2.5 MG tablet Take 1 tablet by mouth Daily. 90 tablet 1 5/17/2023   • pregabalin (LYRICA) 25 MG capsule Take 1 capsule by mouth 2 (Two) Times a Day. 1 tab PO BID   5/17/2023   • ibuprofen (ADVIL,MOTRIN) 800 MG tablet 1 tablet.   Unknown   • pantoprazole (Protonix) 40 MG EC tablet Take 1 tablet by mouth Daily. For 4 weeks then every other day-take 30 min before breakfasst 30 tablet 2 Unknown   • tiZANidine  (ZANAFLEX) 4 MG tablet Take 1 tablet by mouth Every 8 (Eight) Hours As Needed for Muscle Spasms. 1 tab PO Q 8 H   More than a month   • vitamin D (ERGOCALCIFEROL) 1.25 MG (85425 UT) capsule capsule Take 1 capsule by mouth 1 (One) Time Per Week. x 6 months 12 capsule 1 5/14/2023       Allergies:  Gabapentin and Adhesive tape        Objective     Weight 93.8 kg (206 lb 12.7 oz).    Physical Exam   Constitutional: Pt is oriented to person, place, and time and well-developed, well-nourished, and in no distress.   Mouth/Throat: Oropharynx is clear and moist.   Neck: Normal range of motion.   Cardiovascular: Normal rate, regular rhythm and normal heart sounds.    Pulmonary/Chest: Effort normal and breath sounds normal.   Abdominal: Soft. Nontender  Skin: Skin is warm and dry.   Psychiatric: Mood, memory, affect and judgment normal.     Assessment & Plan     Diagnosis:  gerd  Change in bowel habits  Dysphagia  Diarrhea      Anticipated Surgical Procedure:  egd  colonosopy    The risks, benefits, and alternatives of this procedure have been discussed with the patient or the responsible party- the patient understands and agrees to proceed.

## 2023-05-18 NOTE — ANESTHESIA PREPROCEDURE EVALUATION
Anesthesia Evaluation     Patient summary reviewed and Nursing notes reviewed   no history of anesthetic complications:  NPO Solid Status: > 8 hours  NPO Liquid Status: > 2 hours           Airway   Mallampati: II  TM distance: >3 FB  Neck ROM: full  No difficulty expected  Dental      Pulmonary - negative pulmonary ROS and normal exam    breath sounds clear to auscultation  Cardiovascular - negative cardio ROS and normal exam  Exercise tolerance: good (4-7 METS)    Rhythm: regular  Rate: normal        Neuro/Psych- negative ROS  GI/Hepatic/Renal/Endo    (+)  GERD,      Musculoskeletal     Abdominal    Substance History      OB/GYN          Other        ROS/Med Hx Other: PAT Nursing Notes unavailable.                   Anesthesia Plan    ASA 2     general   total IV anesthesia    Anesthetic plan, risks, benefits, and alternatives have been provided, discussed and informed consent has been obtained with: patient.        CODE STATUS:

## 2023-05-22 LAB
CYTO UR: NORMAL
LAB AP CASE REPORT: NORMAL
LAB AP CLINICAL INFORMATION: NORMAL
PATH REPORT.FINAL DX SPEC: NORMAL
PATH REPORT.GROSS SPEC: NORMAL

## 2023-05-23 RX ORDER — BUDESONIDE 3 MG/1
CAPSULE, COATED PELLETS ORAL
Qty: 161 CAPSULE | Refills: 0 | Status: SHIPPED | OUTPATIENT
Start: 2023-05-23 | End: 2023-08-21

## 2023-05-24 ENCOUNTER — TELEPHONE (OUTPATIENT)
Dept: GASTROENTEROLOGY | Facility: CLINIC | Age: 58
End: 2023-05-24
Payer: COMMERCIAL

## 2023-05-24 NOTE — TELEPHONE ENCOUNTER
I spoke with pt, notified of results. He stated he does not smoke. Encouraged to keep f/u appt.  Voiced understanding.  morenita

## 2023-05-24 NOTE — TELEPHONE ENCOUNTER
----- Message from OSIEL Puga sent at 5/23/2023 12:19 PM EDT -----  Patient's colon biopsy consistent with oncocytic colitis.  This is an inflammatory disease of the colon that can cause worsening diarrhea.  We are able to make this diagnosis using biopsies of the colon tissue.  NSAIDs such as ibuprofen, Aleve, Advil can trigger a flare of collagenous colitis.  Have the patient avoid NSAIDs if possible.  Smoking can also cause triggering and colitis flareup, if the patient is a smoker I have patient work towards smoking cessation.  For treatment of lymphocytic colitis we use a budesonide taper which is an oral steroid that has low systemic exposure and does help to reduce the inflammation in the colon.  I have sent the budesonide taper to the pharmacy.  Patient can also use antidiarrheal such as Imodium as needed.  Ensure patient maintains office follow-up.    Chronic inactive gastritis noted on stomach biopsy.  Avoid NSAIDs.

## 2023-08-14 ENCOUNTER — OFFICE VISIT (OUTPATIENT)
Dept: INTERNAL MEDICINE | Facility: CLINIC | Age: 58
End: 2023-08-14
Payer: COMMERCIAL

## 2023-08-14 VITALS
WEIGHT: 209 LBS | OXYGEN SATURATION: 98 % | BODY MASS INDEX: 28.31 KG/M2 | HEIGHT: 72 IN | DIASTOLIC BLOOD PRESSURE: 76 MMHG | HEART RATE: 74 BPM | SYSTOLIC BLOOD PRESSURE: 127 MMHG | TEMPERATURE: 98.4 F

## 2023-08-14 DIAGNOSIS — E55.9 VITAMIN D DEFICIENCY: ICD-10-CM

## 2023-08-14 DIAGNOSIS — E78.00 PURE HYPERCHOLESTEROLEMIA: ICD-10-CM

## 2023-08-14 DIAGNOSIS — I10 PRIMARY HYPERTENSION: Primary | ICD-10-CM

## 2023-08-14 PROBLEM — S92.101A CLOSED FRACTURE OF RIGHT TALUS: Status: ACTIVE | Noted: 2022-11-03

## 2023-08-14 PROCEDURE — 99214 OFFICE O/P EST MOD 30 MIN: CPT | Performed by: INTERNAL MEDICINE

## 2023-08-14 RX ORDER — DULOXETIN HYDROCHLORIDE 30 MG/1
30 CAPSULE, DELAYED RELEASE ORAL DAILY
COMMUNITY
Start: 2023-08-05

## 2023-08-14 NOTE — ASSESSMENT & PLAN NOTE
Lipid abnormalities are LDL not less than 130 currently 138 HDL equals 60s over 40 and has protection from heart disease.  Discussed starting a statin to get LDL at goal less than 130 even though has cardiovascular disease protection with high HDL.  Patient wants to continue to follow low-cholesterol diet and hold off on starting a statin at this time.    Plan-continue to follow low-cholesterol diet discussed diet patient not eating as much specially the [employer] restaurants.  Check lipids in 3 months.  Consider statin if LDL not at goal less than 130.    .

## 2023-08-14 NOTE — PROGRESS NOTES
CHIEF COMPLAINT  Kiran Hensley Sr presents to [facility] for follow-up of Hypertension.    HPI  57-year-old male here for routine checkup and review of labs    Hypertension-stable lost 9 pounds since last visit BP good-patient's goal is weight of 200 pounds and doing well.    Left foot pain-not bothering pt-went to [facility] but they are not in his network--and no pain to c/o at present    Records reviewed, meds reviewed in detail, labs reviewed with patient.  Plan of care is as follows below.    Patient Instructions April 13, 2023   Decrease lisinopril to 2.5 mg once a day  Continue to monitor blood pressure goal is less than 140/80  Get x-rays of left ankle today  Refer to [facility] patient preference  Try to lose 5 to 10 pounds by next visit  Follow-up in 4 months with labs prior okay  Continue with vitamin D once a week as directed then daily vitamin    4/13/23 visit  here for f/u of diarrhea and BP     Neg stool studies-diarrhea x 2/2023-no more diarrhea for 3-4 weeks since last visit-  C/o left ankle pain x yrs -needs to reschedule appointment at [facility]  GERD-using TUMS prn-bitter taste in mouth at times-not worse by  Spicy foods-ffg reflux sx-last EGD-by Dr Wheeler 10 yrs ago--inflamed-better- with pantoprazole- stopped it after 5 days and not needing      Pain -right heel--on cymbalta and lyrica/zanaflex--sees pain mgmt at [facility] Dr Layne-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       Current Outpatient Medications:     Cyanocobalamin (Vitamin B-12) 1000 MCG sublingual tablet, Place 1 tablet under the tongue Daily., Disp: 90 tablet, Rfl: 1    DULoxetine (CYMBALTA) 30  "MG capsule, Take 1 capsule by mouth Daily., Disp: , Rfl:     lisinopril (PRINIVIL,ZESTRIL) 2.5 MG tablet, Take 1 tablet by mouth Daily., Disp: 90 tablet, Rfl: 1    pregabalin (LYRICA) 25 MG capsule, Take 1 capsule by mouth 3 (Three) Times a Day. 1 tab PO BID, Disp: , Rfl:     tiZANidine (ZANAFLEX) 4 MG tablet, Take 1 tablet by mouth As Needed for Muscle Spasms. 1 tab PO Q 8 H, Disp: , Rfl:    PFSH reviewed.      OBJECTIVE  Vital Signs  Vitals:    08/14/23 0905   BP: 127/76   BP Location: Left arm   Patient Position: Sitting   Cuff Size: Adult   Pulse: 74   Temp: 98.4 øF (36.9 øC)   TempSrc: Tympanic   SpO2: 98%   Weight: 94.8 kg (209 lb)   Height: 182.9 cm (72.01\")      Body mass index is 28.34 kg/mý.    Physical Exam  Vitals and nursing note reviewed.   Constitutional:       Appearance: Normal appearance.   HENT:      Head: Normocephalic and atraumatic.      Nose: Nose normal.   Eyes:      Extraocular Movements: Extraocular movements intact.      Pupils: Pupils are equal, round, and reactive to light.   Cardiovascular:      Rate and Rhythm: Normal rate and regular rhythm.   Pulmonary:      Effort: Pulmonary effort is normal.      Breath sounds: Normal breath sounds.   Abdominal:      General: Abdomen is flat.      Palpations: Abdomen is soft.   Musculoskeletal:      Cervical back: Normal range of motion and neck supple.   Skin:     General: Skin is warm and dry.   Neurological:      General: No focal deficit present.      Mental Status: He is alert and oriented to person, place, and time.   Psychiatric:         Mood and Affect: Mood normal.         Behavior: Behavior normal.        RESULTS REVIEW  No results found for: PROBNP, BNP  CMP          2/22/2023    15:53 7/3/2023    11:32   CMP   Glucose 83  92    BUN 10  12    Creatinine 0.90  1.01    EGFR 98.4  85.7    Sodium 137  138    Potassium 3.9  4.4    Chloride 103  103    Calcium 9.4  10.1    Total Protein 7.4  7.4    Albumin 4.4  4.4    Globulin 3.0  3.0  "   Total Bilirubin 0.9  0.6    Alkaline Phosphatase 136  99    AST (SGOT) 19  15    ALT (SGPT) 23  14    Albumin/Globulin Ratio 1.5  1.5    BUN/Creatinine Ratio 11.1  11.9    Anion Gap 6.3  8.5      CBC w/diff          2/22/2023    16:03   CBC w/Diff   WBC 4.80    RBC 5.19    Hemoglobin 13.4    Hematocrit 41.5    MCV 80.0    MCH 25.8    MCHC 32.3    RDW 15.5    Platelets 296    Neutrophil Rel % 57.7    Immature Granulocyte Rel % 0.4    Lymphocyte Rel % 25.8    Monocyte Rel % 10.6    Eosinophil Rel % 3.8    Basophil Rel % 1.7       Lipid Panel          2/22/2023    15:53 7/3/2023    11:32   Lipid Panel   Total Cholesterol 229  221    Triglycerides 66  130    HDL Cholesterol 64  60    VLDL Cholesterol 11  23    LDL Cholesterol  154  138    LDL/HDL Ratio 2.37  2.25       Lab Results   Component Value Date    TSH 0.783 02/22/2023    TSH 1.250 03/08/2022    TSH 1.750 12/28/2020      Lab Results   Component Value Date    FREET4 1.24 02/22/2023         Lab Results   Component Value Date    ZQWJALJU92 276 02/22/2023    TWQL19WA 30.5 07/03/2023    MG 2.3 02/22/2023        No Images in the past 120 days found..             ASSESSMENT & PLAN  Diagnoses and all orders for this visit:    1. Primary hypertension (Primary)  Assessment & Plan:  Hypertension is stable. Try to monitor BP at home 3-4 x/week or prn    Plan-goal weight 200lbs--discussed lifestyle changes low-salt diet still eating out once a week to a  [place] restaurant usually-continue lisinopril 2.5 mg once a day monitor blood pressure if systolic blood pressure less than 100 hold medication.    Orders:  -     Comprehensive Metabolic Panel; Future  -     Lipid Panel; Future  -     TSH+Free T4; Future  -     T3, Free; Future  -     Vitamin B12; Future  -     Folate; Future  -     Magnesium; Future  -     Vitamin D,25-Hydroxy; Future  -     CBC & Differential; Future    2. Pure hypercholesterolemia  Assessment & Plan:  Lipid abnormalities are LDL not less than 130  currently 138 HDL equals 60s over 40 and has protection from heart disease.  Discussed starting a statin to get LDL at goal less than 130 even though has cardiovascular disease protection with high HDL.  Patient wants to continue to follow low-cholesterol diet and hold off on starting a statin at this time.    Plan-continue to follow low-cholesterol diet discussed diet patient not eating as much specially the [email or web address] restaurants.  Check lipids in 3 months.  Consider statin if LDL not at goal less than 130.    .    Orders:  -     Comprehensive Metabolic Panel; Future  -     Lipid Panel; Future  -     TSH+Free T4; Future  -     T3, Free; Future  -     Vitamin B12; Future  -     Folate; Future  -     Magnesium; Future  -     Vitamin D,25-Hydroxy; Future  -     CBC & Differential; Future    3. Vitamin D deficiency  Comments:  TRY OTC VIT d 2000 U DAILY-PT WILL GO TO HIS WORKERS COMP WHO S]WILL ADDRESS THIS PER PT  Overview:  TRY OTC VIT d 2000 U DAILY-PT WILL GO TO HIS WORKERS COMP WHO S]WILL ADDRESS THIS PER PT    Orders:  -     Comprehensive Metabolic Panel; Future  -     Lipid Panel; Future  -     TSH+Free T4; Future  -     T3, Free; Future  -     Vitamin B12; Future  -     Folate; Future  -     Magnesium; Future  -     Vitamin D,25-Hydroxy; Future  -     CBC & Differential; Future               Patient Instructions   continue lisinopril 2.5 mg once a day monitor blood pressure if systolic blood pressure less than 100 hold medication.         FOLLOW UP  Return in about 3 months (around 11/14/2023) for Recheck.    Patient was given instructions and counseling regarding his condition or for health maintenance advice. Please see specific information pulled into the AVS if appropriate.

## 2023-08-14 NOTE — ASSESSMENT & PLAN NOTE
Hypertension is stable. Try to monitor BP at home 3-4 x/week or prn    Plan-goal weight 200lbs--discussed lifestyle changes low-salt diet still eating out once a week to a  [place] restaurant usually-continue lisinopril 2.5 mg once a day monitor blood pressure if systolic blood pressure less than 100 hold medication.

## 2023-10-05 DIAGNOSIS — I10 PRIMARY HYPERTENSION: ICD-10-CM

## 2023-10-06 RX ORDER — LISINOPRIL 2.5 MG/1
TABLET ORAL
Qty: 90 TABLET | Refills: 0 | Status: SHIPPED | OUTPATIENT
Start: 2023-10-06

## 2025-02-11 ENCOUNTER — OFFICE VISIT (OUTPATIENT)
Dept: INTERNAL MEDICINE | Age: 60
End: 2025-02-11
Payer: COMMERCIAL

## 2025-02-11 VITALS
OXYGEN SATURATION: 96 % | BODY MASS INDEX: 28.58 KG/M2 | TEMPERATURE: 97.4 F | DIASTOLIC BLOOD PRESSURE: 80 MMHG | HEIGHT: 72 IN | WEIGHT: 211 LBS | HEART RATE: 86 BPM | SYSTOLIC BLOOD PRESSURE: 122 MMHG

## 2025-02-11 DIAGNOSIS — G89.29 CHRONIC HEEL PAIN, RIGHT: ICD-10-CM

## 2025-02-11 DIAGNOSIS — Z00.00 ROUTINE HISTORY AND PHYSICAL EXAMINATION OF ADULT: Primary | ICD-10-CM

## 2025-02-11 DIAGNOSIS — C44.91 BASAL CELL CARCINOMA (BCC), UNSPECIFIED SITE: ICD-10-CM

## 2025-02-11 DIAGNOSIS — K21.9 GASTROESOPHAGEAL REFLUX DISEASE WITHOUT ESOPHAGITIS: ICD-10-CM

## 2025-02-11 DIAGNOSIS — F41.9 ANXIETY: ICD-10-CM

## 2025-02-11 DIAGNOSIS — I10 PRIMARY HYPERTENSION: ICD-10-CM

## 2025-02-11 DIAGNOSIS — M79.671 CHRONIC HEEL PAIN, RIGHT: ICD-10-CM

## 2025-02-11 DIAGNOSIS — E78.00 PURE HYPERCHOLESTEROLEMIA: ICD-10-CM

## 2025-02-11 LAB
25(OH)D3 SERPL-MCNC: 19.4 NG/ML (ref 30–100)
ALBUMIN SERPL-MCNC: 4.2 G/DL (ref 3.5–5.2)
ALBUMIN/GLOB SERPL: 1.3 G/DL
ALP SERPL-CCNC: 96 U/L (ref 39–117)
ALT SERPL W P-5'-P-CCNC: 19 U/L (ref 1–41)
ANION GAP SERPL CALCULATED.3IONS-SCNC: 11.9 MMOL/L (ref 5–15)
AST SERPL-CCNC: 24 U/L (ref 1–40)
BASOPHILS # BLD AUTO: 0.05 10*3/MM3 (ref 0–0.2)
BASOPHILS NFR BLD AUTO: 1.1 % (ref 0–1.5)
BILIRUB SERPL-MCNC: 0.7 MG/DL (ref 0–1.2)
BUN SERPL-MCNC: 12 MG/DL (ref 8–23)
BUN/CREAT SERPL: 13.2 (ref 7–25)
CALCIUM SPEC-SCNC: 9.8 MG/DL (ref 8.6–10.5)
CHLORIDE SERPL-SCNC: 100 MMOL/L (ref 98–107)
CHOLEST SERPL-MCNC: 216 MG/DL (ref 0–200)
CO2 SERPL-SCNC: 24.1 MMOL/L (ref 22–29)
CREAT SERPL-MCNC: 0.91 MG/DL (ref 0.76–1.27)
DEPRECATED RDW RBC AUTO: 39.8 FL (ref 37–54)
EGFRCR SERPLBLD CKD-EPI 2021: 95.9 ML/MIN/1.73
EOSINOPHIL # BLD AUTO: 0.21 10*3/MM3 (ref 0–0.4)
EOSINOPHIL NFR BLD AUTO: 4.7 % (ref 0.3–6.2)
ERYTHROCYTE [DISTWIDTH] IN BLOOD BY AUTOMATED COUNT: 13.6 % (ref 12.3–15.4)
FOLATE SERPL-MCNC: 5.03 NG/ML (ref 4.78–24.2)
GLOBULIN UR ELPH-MCNC: 3.2 GM/DL
GLUCOSE SERPL-MCNC: 90 MG/DL (ref 65–99)
HCT VFR BLD AUTO: 43 % (ref 37.5–51)
HDLC SERPL-MCNC: 49 MG/DL (ref 40–60)
HGB BLD-MCNC: 14.2 G/DL (ref 13–17.7)
IMM GRANULOCYTES # BLD AUTO: 0.01 10*3/MM3 (ref 0–0.05)
IMM GRANULOCYTES NFR BLD AUTO: 0.2 % (ref 0–0.5)
LDLC SERPL CALC-MCNC: 154 MG/DL (ref 0–100)
LDLC/HDLC SERPL: 3.11 {RATIO}
LYMPHOCYTES # BLD AUTO: 1.57 10*3/MM3 (ref 0.7–3.1)
LYMPHOCYTES NFR BLD AUTO: 35 % (ref 19.6–45.3)
MAGNESIUM SERPL-MCNC: 2.2 MG/DL (ref 1.6–2.4)
MCH RBC QN AUTO: 26.8 PG (ref 26.6–33)
MCHC RBC AUTO-ENTMCNC: 33 G/DL (ref 31.5–35.7)
MCV RBC AUTO: 81.3 FL (ref 79–97)
MONOCYTES # BLD AUTO: 0.53 10*3/MM3 (ref 0.1–0.9)
MONOCYTES NFR BLD AUTO: 11.8 % (ref 5–12)
NEUTROPHILS NFR BLD AUTO: 2.12 10*3/MM3 (ref 1.7–7)
NEUTROPHILS NFR BLD AUTO: 47.2 % (ref 42.7–76)
NRBC BLD AUTO-RTO: 0 /100 WBC (ref 0–0.2)
PLATELET # BLD AUTO: 250 10*3/MM3 (ref 140–450)
PMV BLD AUTO: 11.3 FL (ref 6–12)
POTASSIUM SERPL-SCNC: 4.4 MMOL/L (ref 3.5–5.2)
PROT SERPL-MCNC: 7.4 G/DL (ref 6–8.5)
RBC # BLD AUTO: 5.29 10*6/MM3 (ref 4.14–5.8)
SODIUM SERPL-SCNC: 136 MMOL/L (ref 136–145)
T3FREE SERPL-MCNC: 3.8 PG/ML (ref 2–4.4)
T4 FREE SERPL-MCNC: 1.35 NG/DL (ref 0.92–1.68)
TRIGL SERPL-MCNC: 72 MG/DL (ref 0–150)
TSH SERPL DL<=0.05 MIU/L-ACNC: 1.35 UIU/ML (ref 0.27–4.2)
VIT B12 BLD-MCNC: 378 PG/ML (ref 211–946)
VLDLC SERPL-MCNC: 13 MG/DL (ref 5–40)
WBC NRBC COR # BLD AUTO: 4.49 10*3/MM3 (ref 3.4–10.8)

## 2025-02-11 PROCEDURE — 82607 VITAMIN B-12: CPT | Performed by: INTERNAL MEDICINE

## 2025-02-11 PROCEDURE — 84439 ASSAY OF FREE THYROXINE: CPT | Performed by: INTERNAL MEDICINE

## 2025-02-11 PROCEDURE — 80061 LIPID PANEL: CPT | Performed by: INTERNAL MEDICINE

## 2025-02-11 PROCEDURE — 84481 FREE ASSAY (FT-3): CPT | Performed by: INTERNAL MEDICINE

## 2025-02-11 PROCEDURE — 99214 OFFICE O/P EST MOD 30 MIN: CPT | Performed by: INTERNAL MEDICINE

## 2025-02-11 PROCEDURE — 83735 ASSAY OF MAGNESIUM: CPT | Performed by: INTERNAL MEDICINE

## 2025-02-11 PROCEDURE — 99396 PREV VISIT EST AGE 40-64: CPT | Performed by: INTERNAL MEDICINE

## 2025-02-11 PROCEDURE — 82746 ASSAY OF FOLIC ACID SERUM: CPT | Performed by: INTERNAL MEDICINE

## 2025-02-11 PROCEDURE — 80050 GENERAL HEALTH PANEL: CPT | Performed by: INTERNAL MEDICINE

## 2025-02-11 PROCEDURE — 82306 VITAMIN D 25 HYDROXY: CPT | Performed by: INTERNAL MEDICINE

## 2025-02-11 RX ORDER — LISINOPRIL 2.5 MG/1
TABLET ORAL
Qty: 30 TABLET | Refills: 2 | Status: SHIPPED | OUTPATIENT
Start: 2025-02-11

## 2025-02-11 RX ORDER — BUSPIRONE HYDROCHLORIDE 10 MG/1
TABLET ORAL
Qty: 60 TABLET | Refills: 2 | Status: SHIPPED | OUTPATIENT
Start: 2025-02-11

## 2025-02-11 RX ORDER — FAMOTIDINE 20 MG/1
20 TABLET, FILM COATED ORAL DAILY
Qty: 30 TABLET | Refills: 2 | Status: SHIPPED | OUTPATIENT
Start: 2025-02-11

## 2025-02-11 NOTE — ASSESSMENT & PLAN NOTE
Heel pain is not a problem and stable patient is not needing any medicines he is not on the Cymbalta Lyrica Zanaflex has not been seeing pain management anymore.    Plan no meds needed at this time

## 2025-02-11 NOTE — ASSESSMENT & PLAN NOTE
Hypertension is stable. BP at home equals 142/92, 147/97 yesterday-126/82 -  BP at clinic equals 122/80     Plan-goal weight 200lbs--discussed lifestyle changes low-salt diet -naveed  eating out at  [place] restaurant every week anymore-  Can use lisinopril 2.5 mg 1 daily if blood pressure greater than 140/80 -  Patient states feels he is going to be more active especially with warmer weather  Patient has had increased stress from work discussed as anxiety and stress can also increase blood pressure will give buspirone to use as needed which may help his blood pressure

## 2025-02-11 NOTE — ASSESSMENT & PLAN NOTE
Symptoms of reflux-using Tums as needed -some relief following reflux precautions last EGD was 10 years ago and showed esophagitis-Pepcid in the past has helped    Plan -at prescription for Pepcid/famotidine 20 mg 1 daily as needed for reflux symptoms given  Reflux precautions discussed again

## 2025-02-11 NOTE — ASSESSMENT & PLAN NOTE
Lipid abnormalities are LDL not less than 130 currently 138 HDL equals 60s over 40 and has protection from heart disease.     Plan-continue to follow low-cholesterol diet   Recheck lipids today consider statin in future if LDL not at goal of less than 130.

## 2025-02-11 NOTE — PROGRESS NOTES
"CHIEF COMPLAINT  Kiran Hensley Sr presents to [facility] INTERNAL MEDICINE for follow-up of Hypertension (Blood pressure has been elevated 144/92, 147/97).    HPI    58 yo pt with HTN, HLD GERD, chronic right heel pain-sees pain mgmt [facility]  here for f/u   /92, 147/97 yesterday-126/82 last night  Last seen by me 8/14/23    Records reviewed, meds reviewed in detail, labs reviewed with patient.  Plan of care is as follows below.    [facility]  [facility],     Current Outpatient Medications:     busPIRone (BUSPAR) 10 MG tablet, One tab po up to BID Prn anxiety, Disp: 60 tablet, Rfl: 2    famotidine (Pepcid) 20 MG tablet, Take 1 tablet by mouth Daily. Prn reflux, Disp: 30 tablet, Rfl: 2    lisinopril (Zestril) 2.5 MG tablet, Take one po if BP >140/80-up to one daily, Disp: 30 tablet, Rfl: 2   PFSH reviewed.      OBJECTIVE  Vital Signs  Vitals:    02/11/25 0852 02/11/25 0914   BP: 138/90 122/80   BP Location: Left arm Right arm   Patient Position: Sitting Sitting   Cuff Size: Small Adult Adult   Pulse: 86    Temp: 97.4 °F (36.3 °C)    SpO2: 96%    Weight: 95.7 kg (211 lb)    Height: 182.9 cm (72.01\")       Body mass index is 28.61 kg/m².    Physical Exam  Vitals and nursing note reviewed.   Constitutional:       Appearance: Normal appearance.   HENT:      Head: Normocephalic and atraumatic.      Nose: Nose normal.   Eyes:      Extraocular Movements: Extraocular movements intact.      Pupils: Pupils are equal, round, and reactive to light.   Cardiovascular:      Rate and Rhythm: Normal rate and regular rhythm.   Pulmonary:      Effort: Pulmonary effort is normal.      Breath sounds: Normal breath sounds.   Abdominal:      General: Abdomen is flat.      Palpations: Abdomen is soft.   Musculoskeletal:      Cervical back: Normal range of motion and neck supple.   Skin:     General: Skin is warm and dry.   Neurological:      General: No focal deficit present.      Mental Status: He is alert and " "oriented to person, place, and time.   Psychiatric:         Mood and Affect: Mood normal.         Behavior: Behavior normal.          RESULTS REVIEW  No results found for: \"PROBNP\", \"BNP\"          Lab Results   Component Value Date    TSH 0.783 02/22/2023    TSH 1.250 03/08/2022    TSH 1.750 12/28/2020      Lab Results   Component Value Date    FREET4 1.24 02/22/2023         Lab Results   Component Value Date    GURHMCIN00 276 02/22/2023    NYTS13LX 30.5 07/03/2023    MG 2.3 02/22/2023        No Images in the past 120 days found..             ASSESSMENT & PLAN  Diagnoses and all orders for this visit:    1. Routine history and physical examination of adult (Primary)  Assessment & Plan:  Ages 40-64 Counseling/Anticipatory Guidance Discussed: nutrition, physical activity, healthy weight, injury prevention, misuse of tobacco, alcohol and drugs, dental health, mental health, immunizations, screenings, and use of seatbelt    Orders:  -     Comprehensive Metabolic Panel; Future  -     Lipid Panel; Future  -     TSH+Free T4; Future  -     T3, Free; Future  -     Vitamin B12; Future  -     Folate; Future  -     Magnesium; Future  -     Vitamin D,25-Hydroxy; Future  -     CBC & Differential; Future  -     Cancel: MicroAlbumin, Urine, Random - Urine, Clean Catch; Future  -     lisinopril (Zestril) 2.5 MG tablet; Take one po if BP >140/80-up to one daily  Dispense: 30 tablet; Refill: 2  -     CBC & Differential  -     Vitamin D,25-Hydroxy  -     Magnesium  -     Folate  -     Vitamin B12  -     T3, Free  -     TSH+Free T4  -     Lipid Panel  -     Comprehensive Metabolic Panel    2. Primary hypertension  Assessment & Plan:  Hypertension is stable. BP at home equals 142/92, 147/97 yesterday-126/82 -  BP at clinic equals 122/80     Plan-goal weight 200lbs--discussed lifestyle changes low-salt diet -naveed  eating out at  Mexican restaurant every week anymore-  Can use lisinopril 2.5 mg 1 daily if blood pressure greater than 140/80 " -  Patient states feels he is going to be more active especially with warmer weather  Patient has had increased stress from work discussed as anxiety and stress can also increase blood pressure will give buspirone to use as needed which may help his blood pressure    Orders:  -     Comprehensive Metabolic Panel; Future  -     Lipid Panel; Future  -     TSH+Free T4; Future  -     T3, Free; Future  -     Vitamin B12; Future  -     Folate; Future  -     Magnesium; Future  -     Vitamin D,25-Hydroxy; Future  -     CBC & Differential; Future  -     Cancel: MicroAlbumin, Urine, Random - Urine, Clean Catch; Future  -     lisinopril (Zestril) 2.5 MG tablet; Take one po if BP >140/80-up to one daily  Dispense: 30 tablet; Refill: 2  -     CBC & Differential  -     Vitamin D,25-Hydroxy  -     Magnesium  -     Folate  -     Vitamin B12  -     T3, Free  -     TSH+Free T4  -     Lipid Panel  -     Comprehensive Metabolic Panel    3. 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 Gastroesophageal reflux disease without esophagitis  Assessment & Plan:  Symptoms of reflux-using Tums as needed -some relief following reflux precautions last EGD was 10 years ago and showed esophagitis-Pepcid in the past has helped    Plan -at prescription for Pepcid/famotidine 20 mg 1 daily as needed for reflux symptoms given  Reflux precautions discussed again    Orders:  -     Comprehensive Metabolic Panel; Future  -     Lipid Panel; Future  -     TSH+Free T4; Future  -     T3, Free; Future  -     Vitamin B12; Future  -     Folate; Future  -     Magnesium; Future  -     Vitamin  D,25-Hydroxy; Future  -     CBC & Differential; Future  -     Cancel: MicroAlbumin, Urine, Random - Urine, Clean Catch; Future  -     famotidine (Pepcid) 20 MG tablet; Take 1 tablet by mouth Daily. Prn reflux  Dispense: 30 tablet; Refill: 2  -     CBC & Differential  -     Vitamin D,25-Hydroxy  -     Magnesium  -     Folate  -     Vitamin B12  -     T3, Free  -     TSH+Free T4  -     Lipid Panel  -     Comprehensive Metabolic Panel    5. Pure hypercholesterolemia  Assessment & Plan:  Lipid abnormalities are LDL not less than 130 currently 138 HDL equals 60s over 40 and has protection from heart disease.     Plan-continue to follow low-cholesterol diet   Recheck lipids today consider statin in future if LDL not at goal of less than 130.    Orders:  -     Comprehensive Metabolic Panel; Future  -     Lipid Panel; Future  -     TSH+Free T4; Future  -     T3, Free; Future  -     Vitamin B12; Future  -     Folate; Future  -     Magnesium; Future  -     Vitamin D,25-Hydroxy; Future  -     CBC & Differential; Future  -     Cancel: MicroAlbumin, Urine, Random - Urine, Clean Catch; Future  -     CBC & Differential  -     Vitamin D,25-Hydroxy  -     Magnesium  -     Folate  -     Vitamin B12  -     T3, Free  -     TSH+Free T4  -     Lipid Panel  -     Comprehensive Metabolic Panel    6. Chronic heel pain, right  Overview:  Patient was on Cymbalta Lyrica and Zanaflex in the past was seeing pain management at [facility] due to work accident 9/14/2022 when he fell 11 feet from a concrete truck and right heel was crushed.  Was seeing Dr. Boston trauma Dr. Aguilar.  Currently is not on any medications and no complaint of heel pain        Assessment & Plan:  Heel pain is not a problem and stable patient is not needing any medicines he is not on the Cymbalta Lyrica Zanaflex has not been seeing pain management anymore.    Plan no meds needed at this time    Orders:  -     Comprehensive Metabolic Panel; Future  -     Lipid Panel;  Future  -     TSH+Free T4; Future  -     T3, Free; Future  -     Vitamin B12; Future  -     Folate; Future  -     Magnesium; Future  -     Vitamin D,25-Hydroxy; Future  -     CBC & Differential; Future  -     Cancel: MicroAlbumin, Urine, Random - Urine, Clean Catch; Future  -     CBC & Differential  -     Vitamin D,25-Hydroxy  -     Magnesium  -     Folate  -     Vitamin B12  -     T3, Free  -     TSH+Free T4  -     Lipid Panel  -     Comprehensive Metabolic Panel    7. Basal cell carcinoma (BCC), unspecified site  Comments:  for surg [facility] in 3/2024--3 lesions to be removed         BMI is >= 25 and <30. (Overweight) The following options were offered after discussion;: weight loss educational material (shared in after visit summary), exercise counseling/recommendations, and nutrition counseling/recommendations     Cscope 2023--normal--f/u in 10 yrs  EGD esophagitis- then     Patient Instructions   Use famotidine/Pepcid 20 mg once daily as needed for reflux symptoms    Relux precautions discussed    Try buspirone 10 mg up to twice a day as needed anxiety    Follow lifestyle changes-goal BP <130/80--monitor BP    Will give lisinopril 2.5 mg take one tablet if BP >140/80 -up to one daily    F/u 3 months       Do labs today  FOLLOW UP  Return in about 3 months (around 5/11/2025) for Recheck.  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      Patient was given instructions and counseling regarding his condition or for health maintenance advice. Please see specific information pulled into the AVS if appropriate.

## 2025-02-11 NOTE — PATIENT INSTRUCTIONS
Use famotidine/Pepcid 20 mg once daily as needed for reflux symptoms    Relux precautions discussed    Try buspirone 10 mg up to twice a day as needed anxiety    Follow lifestyle changes-goal BP <130/80--monitor BP    Will give lisinopril 2.5 mg take one tablet if BP >140/80 -up to one daily    F/u 3 months

## 2025-02-11 NOTE — ASSESSMENT & PLAN NOTE
Ages 40-64 Counseling/Anticipatory Guidance Discussed: nutrition, physical activity, healthy weight, injury prevention, misuse of tobacco, alcohol and drugs, dental health, mental health, immunizations, screenings, and use of seatbelt

## 2025-02-11 NOTE — ASSESSMENT & PLAN NOTE
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

## 2025-02-12 ENCOUNTER — TELEPHONE (OUTPATIENT)
Dept: INTERNAL MEDICINE | Age: 60
End: 2025-02-12
Payer: COMMERCIAL

## 2025-02-12 DIAGNOSIS — E53.8 VITAMIN B 12 DEFICIENCY: Primary | ICD-10-CM

## 2025-02-12 DIAGNOSIS — E55.9 VITAMIN D DEFICIENCY: ICD-10-CM

## 2025-02-12 RX ORDER — ERGOCALCIFEROL 1.25 MG/1
50000 CAPSULE, LIQUID FILLED ORAL WEEKLY
Qty: 12 CAPSULE | Refills: 1 | Status: SHIPPED | OUTPATIENT
Start: 2025-02-12

## 2025-02-12 RX ORDER — MAGNESIUM 200 MG
1 TABLET ORAL DAILY
Qty: 90 TABLET | Refills: 1 | Status: SHIPPED | OUTPATIENT
Start: 2025-02-12

## 2025-07-01 ENCOUNTER — HOSPITAL ENCOUNTER (EMERGENCY)
Facility: HOSPITAL | Age: 60
Discharge: HOME | End: 2025-07-01
Attending: EMERGENCY MEDICINE | Admitting: EMERGENCY MEDICINE
Payer: OTHER MISCELLANEOUS

## 2025-07-01 ENCOUNTER — APPOINTMENT (OUTPATIENT)
Dept: CT IMAGING | Facility: HOSPITAL | Age: 60
End: 2025-07-01
Payer: OTHER MISCELLANEOUS

## 2025-07-01 ENCOUNTER — APPOINTMENT (OUTPATIENT)
Dept: GENERAL RADIOLOGY | Facility: HOSPITAL | Age: 60
End: 2025-07-01
Payer: OTHER MISCELLANEOUS

## 2025-07-01 VITALS
WEIGHT: 211.42 LBS | HEART RATE: 62 BPM | SYSTOLIC BLOOD PRESSURE: 125 MMHG | HEIGHT: 72 IN | BODY MASS INDEX: 28.64 KG/M2 | RESPIRATION RATE: 18 BRPM | TEMPERATURE: 98.1 F | DIASTOLIC BLOOD PRESSURE: 83 MMHG | OXYGEN SATURATION: 99 %

## 2025-07-01 DIAGNOSIS — S16.1XXA ACUTE STRAIN OF NECK MUSCLE, INITIAL ENCOUNTER: ICD-10-CM
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
## 2025-07-01 DIAGNOSIS — S80.02XA CONTUSION OF LEFT KNEE, INITIAL ENCOUNTER: ICD-10-CM

## 2025-07-01 DIAGNOSIS — S39.012A STRAIN OF LUMBAR REGION, INITIAL ENCOUNTER: ICD-10-CM

## 2025-07-01 PROCEDURE — 99284 EMERGENCY DEPT VISIT MOD MDM: CPT

## 2025-07-01 PROCEDURE — 73562 X-RAY EXAM OF KNEE 3: CPT

## 2025-07-01 PROCEDURE — 72125 CT NECK SPINE W/O DYE: CPT

## 2025-07-01 PROCEDURE — 70450 CT HEAD/BRAIN W/O DYE: CPT

## 2025-07-01 PROCEDURE — 72100 X-RAY EXAM L-S SPINE 2/3 VWS: CPT

## 2025-07-01 RX ORDER — HYDROCODONE BITARTRATE AND ACETAMINOPHEN 5; 325 MG/1; MG/1
1 TABLET ORAL ONCE
Refills: 0 | Status: COMPLETED | OUTPATIENT
Start: 2025-07-01 | End: 2025-07-01

## 2025-07-01 RX ADMIN — HYDROCODONE BITARTRATE AND ACETAMINOPHEN 1 TABLET: 5; 325 TABLET ORAL at 10:36

## 2025-07-01 NOTE — DISCHARGE INSTRUCTIONS
Stay hydrated.  Return to the ER immediately for development of chest pain or difficulty breathing, abdominal pain.  You may expect to be increasingly sore for the next few days.  Follow-up with your family doctor if things or not improving in 2 to 3 days.
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
## 2025-07-21 DIAGNOSIS — Z00.00 ROUTINE HISTORY AND PHYSICAL EXAMINATION OF ADULT: ICD-10-CM

## 2025-07-21 DIAGNOSIS — I10 PRIMARY HYPERTENSION: ICD-10-CM

## 2025-07-21 RX ORDER — LISINOPRIL 2.5 MG/1
TABLET ORAL
Qty: 30 TABLET | Refills: 2 | Status: SHIPPED | OUTPATIENT
Start: 2025-07-21

## 2025-07-21 NOTE — TELEPHONE ENCOUNTER
Caller: Kiran Hensley Sr    Relationship: Self    Best call back number:     [phone]     Requested Prescriptions:   Requested Prescriptions     Pending Prescriptions Disp Refills    lisinopril (Zestril) 2.5 MG tablet 30 tablet 2     Sig: Take one po if BP >140/80-up to one daily        Pharmacy where request should be sent: [address] [phone]  - [phone]     Last office visit with prescribing clinician: 2/11/2025   Last telemedicine visit with prescribing clinician: Visit date not found   Next office visit with prescribing clinician: Visit date not found     Additional details provided by patient:     Does the patient have less than a 3 day supply:  [] Yes  [] No    Would you like a call back once the refill request has been completed: [] Yes [] No    If the office needs to give you a call back, can they leave a voicemail: [] Yes [] No    Chelo Godoy Rep   07/21/25 11:10 EDT

## (undated) DEVICE — SINGLE-USE BIOPSY FORCEPS: Brand: RADIAL JAW 4

## (undated) DEVICE — BG RESUS AIRFLOW MANL W/MASK A/ 1900ML 1P/U

## (undated) DEVICE — SOL IRRG H2O PL/BG 1000ML STRL

## (undated) DEVICE — Device: Brand: DEFENDO AIR/WATER/SUCTION AND BIOPSY VALVE

## (undated) DEVICE — LINER SURG CANSTR SXN S/RIGD 1500CC

## (undated) DEVICE — CONN JET HYDRA H20 AUXILIARY DISP

## (undated) DEVICE — SOLIDIFIER LIQLOC PLS 1500CC BT

## (undated) DEVICE — BLCK/BITE BLOX WO/DENTL/RIM W/STRAP 54F

## (undated) DEVICE — Device